# Patient Record
Sex: FEMALE | Race: WHITE | Employment: OTHER | URBAN - METROPOLITAN AREA
[De-identification: names, ages, dates, MRNs, and addresses within clinical notes are randomized per-mention and may not be internally consistent; named-entity substitution may affect disease eponyms.]

---

## 2017-01-01 ENCOUNTER — MED REC SCAN ONLY (OUTPATIENT)
Dept: FAMILY MEDICINE CLINIC | Facility: CLINIC | Age: 77
End: 2017-01-01

## 2017-01-04 ENCOUNTER — TELEPHONE (OUTPATIENT)
Dept: FAMILY MEDICINE CLINIC | Facility: CLINIC | Age: 77
End: 2017-01-04

## 2017-01-04 NOTE — TELEPHONE ENCOUNTER
Pt. Called stating that she had rec'd a letter in the past for her to receive free medication & she would like that letter again. Pt. Stated that it was for all medication & she thinks pharmacy kept the letter.

## 2017-01-04 NOTE — TELEPHONE ENCOUNTER
No record of a letter from our office. Typically there are assistance forms based on her medications but we would need patient to provide forms to be completed.  Please notify patient we would be happy to complete forms but we are not aware that a general l

## 2017-01-05 ENCOUNTER — TELEPHONE (OUTPATIENT)
Dept: NEUROLOGY | Facility: CLINIC | Age: 77
End: 2017-01-05

## 2017-01-16 ENCOUNTER — OFFICE VISIT (OUTPATIENT)
Dept: FAMILY MEDICINE CLINIC | Facility: CLINIC | Age: 77
End: 2017-01-16

## 2017-01-16 VITALS
HEART RATE: 72 BPM | TEMPERATURE: 98 F | WEIGHT: 152 LBS | HEIGHT: 60.75 IN | BODY MASS INDEX: 29.07 KG/M2 | SYSTOLIC BLOOD PRESSURE: 124 MMHG | RESPIRATION RATE: 18 BRPM | DIASTOLIC BLOOD PRESSURE: 70 MMHG

## 2017-01-16 DIAGNOSIS — R42 DIZZINESS: Primary | ICD-10-CM

## 2017-01-16 DIAGNOSIS — E11.9 TYPE 2 DIABETES MELLITUS WITHOUT COMPLICATION, UNSPECIFIED LONG TERM INSULIN USE STATUS: ICD-10-CM

## 2017-01-16 PROCEDURE — 99213 OFFICE O/P EST LOW 20 MIN: CPT | Performed by: PHYSICIAN ASSISTANT

## 2017-01-16 RX ORDER — GLIPIZIDE 5 MG/1
5 TABLET ORAL
Qty: 60 TABLET | Refills: 3 | Status: CANCELLED | OUTPATIENT
Start: 2017-01-16

## 2017-01-16 NOTE — PATIENT INSTRUCTIONS
Thank you for choosing Cory Lawton PA-C at [place] [address]  To Do: Sigrid Casiano  1. Pt to start using atrovent nasal spray  2. Pt to begin OTC Allegra in AM, OTC Zyrtec in PM  3.  Follow-up with Dr. Hammad Wilkinson as scheduled    • Please signup for [facility] AND [facility] we strive to make you healthier and to improve your quality of life.     Referrals, and Radiology Information:    If your insurance requires a referral to a specialist, please allow 5 business days to process your referral request.    If ROBBI Terry

## 2017-01-16 NOTE — PROGRESS NOTES
[facility] Internal Medicine Progress Note    CC:  Patient presents with:  Test Results  Dizziness: 1x daily, on and off for 1 month      HPI:   HPI  Pt states she has been having some dizziness on and off for the past month  She states it only l Rfl: 3   Memantine HCl 10 MG Oral Tab Take 1 tablet (10 mg total) by mouth 2 (two) times daily. Disp: 180 tablet Rfl: 3     No current facility-administered medications on file prior to visit.     Review of Systems :  Review of Systems   Constitutional: Neg complication, unspecified long term insulin use status  HgbA1C is improved some at 7.8  Pt was given referral for diabetic educator, however has not made an appt  Son would like another referral, he would like to go with his mother so he can learn more abo

## 2017-01-30 ENCOUNTER — TELEPHONE (OUTPATIENT)
Dept: FAMILY MEDICINE CLINIC | Facility: CLINIC | Age: 77
End: 2017-01-30

## 2017-01-30 NOTE — TELEPHONE ENCOUNTER
Singulex results from 12/14/16. Elevated lipids.   Total chol - 231  Trig - 152  LDL - 138  Apo B - 111    Future Appointments  Date Time Provider Pratima Turciosisti   2/13/2017 11:15 AM Brenda DURAN PA-C [facility] [address] [facility] [address]   2/16/2017 1:00 PM Lizzeth Mao

## 2017-02-01 ENCOUNTER — MED REC SCAN ONLY (OUTPATIENT)
Dept: FAMILY MEDICINE CLINIC | Facility: CLINIC | Age: 77
End: 2017-02-01

## 2017-02-01 NOTE — TELEPHONE ENCOUNTER
See results below. Attempted to contact patient regarding lab results. Unable to reach, left message to return call to discuss results.

## 2017-02-13 ENCOUNTER — TELEPHONE (OUTPATIENT)
Dept: FAMILY MEDICINE CLINIC | Facility: CLINIC | Age: 77
End: 2017-02-13

## 2017-02-13 ENCOUNTER — HOSPITAL ENCOUNTER (OUTPATIENT)
Dept: GENERAL RADIOLOGY | Age: 77
Discharge: HOME | End: 2017-02-13
Attending: PHYSICIAN ASSISTANT
Payer: MEDICARE

## 2017-02-13 ENCOUNTER — OFFICE VISIT (OUTPATIENT)
Dept: FAMILY MEDICINE CLINIC | Facility: CLINIC | Age: 77
End: 2017-02-13

## 2017-02-13 VITALS
BODY MASS INDEX: 29.07 KG/M2 | SYSTOLIC BLOOD PRESSURE: 118 MMHG | DIASTOLIC BLOOD PRESSURE: 78 MMHG | HEART RATE: 78 BPM | WEIGHT: 154 LBS | TEMPERATURE: 98 F | RESPIRATION RATE: 16 BRPM | HEIGHT: 61 IN

## 2017-02-13 DIAGNOSIS — R42 DIZZINESS: ICD-10-CM

## 2017-02-13 DIAGNOSIS — E11.9 TYPE 2 DIABETES MELLITUS WITHOUT COMPLICATION, WITHOUT LONG-TERM CURRENT USE OF INSULIN (HCC): ICD-10-CM

## 2017-02-13 DIAGNOSIS — R31.9 HEMATURIA: ICD-10-CM

## 2017-02-13 DIAGNOSIS — E53.8 LOW VITAMIN B12 LEVEL: ICD-10-CM

## 2017-02-13 DIAGNOSIS — R79.89 ELEVATED LFTS: ICD-10-CM

## 2017-02-13 DIAGNOSIS — Z12.31 ENCOUNTER FOR SCREENING MAMMOGRAM FOR BREAST CANCER: ICD-10-CM

## 2017-02-13 DIAGNOSIS — M85.80 OSTEOPENIA, UNSPECIFIED LOCATION: ICD-10-CM

## 2017-02-13 DIAGNOSIS — F02.80 ALZHEIMER'S DEMENTIA WITHOUT BEHAVIORAL DISTURBANCE, UNSPECIFIED TIMING OF DEMENTIA ONSET (HCC): ICD-10-CM

## 2017-02-13 DIAGNOSIS — R74.8 ELEVATED LIVER ENZYMES: ICD-10-CM

## 2017-02-13 DIAGNOSIS — K21.9 GASTROESOPHAGEAL REFLUX DISEASE WITHOUT ESOPHAGITIS: ICD-10-CM

## 2017-02-13 DIAGNOSIS — S96.911D RIGHT FOOT STRAIN, SUBSEQUENT ENCOUNTER: ICD-10-CM

## 2017-02-13 DIAGNOSIS — M25.552 LEFT HIP PAIN: ICD-10-CM

## 2017-02-13 DIAGNOSIS — Z00.00 ENCOUNTER FOR ANNUAL HEALTH EXAMINATION: Primary | ICD-10-CM

## 2017-02-13 DIAGNOSIS — N39.41 URGE INCONTINENCE OF URINE: ICD-10-CM

## 2017-02-13 DIAGNOSIS — E55.9 VITAMIN D DEFICIENCY: ICD-10-CM

## 2017-02-13 DIAGNOSIS — R05.9 COUGH: ICD-10-CM

## 2017-02-13 DIAGNOSIS — G30.9 ALZHEIMER'S DEMENTIA WITHOUT BEHAVIORAL DISTURBANCE, UNSPECIFIED TIMING OF DEMENTIA ONSET (HCC): ICD-10-CM

## 2017-02-13 DIAGNOSIS — Z23 NEED FOR INFLUENZA VACCINATION: ICD-10-CM

## 2017-02-13 DIAGNOSIS — E78.5 HYPERLIPIDEMIA, UNSPECIFIED HYPERLIPIDEMIA TYPE: ICD-10-CM

## 2017-02-13 DIAGNOSIS — K76.0 FATTY LIVER: ICD-10-CM

## 2017-02-13 PROCEDURE — 96160 PT-FOCUSED HLTH RISK ASSMT: CPT | Performed by: PHYSICIAN ASSISTANT

## 2017-02-13 PROCEDURE — 90686 IIV4 VACC NO PRSV 0.5 ML IM: CPT | Performed by: PHYSICIAN ASSISTANT

## 2017-02-13 PROCEDURE — G0008 ADMIN INFLUENZA VIRUS VAC: HCPCS | Performed by: PHYSICIAN ASSISTANT

## 2017-02-13 PROCEDURE — 73502 X-RAY EXAM HIP UNI 2-3 VIEWS: CPT

## 2017-02-13 NOTE — TELEPHONE ENCOUNTER
Pt req refill      Name of Medication: Methylfol-Algae-X92-Tvtzbqufzt (CEREFOLIN NAC) 6-90.314-2-600 MG Oral Tab     Dose:     How is medication prescribed: Take 1 tablet by mouth daily    Specific name of pharmacy and location:[facility] [place]

## 2017-02-13 NOTE — TELEPHONE ENCOUNTER
Requesting Cerefolin Nac  LOV: 2/13/17  RTC: not noted  Last Labs: n/a  Filled: 2/12/16 #90 with 3 refills    Future Appointments  Date Time Provider Pratima Corral   2/16/2017 1:00 PM Clifton Cates   2/21/2017 11:45 AM KANIKA Cartwright

## 2017-02-13 NOTE — PROGRESS NOTES
Antoine Lagos is a 68year old female who presents for a MA Supervisit.     Pt states she has been doing well    L groin pain for about 4 months  Sometimes when she gets up she feels a sharp pain, like lightening shooting  No other aggravating or alleviatin Oxybutynin Chloride ER 10 MG Oral Tablet 24 Hr Take 20 mg by mouth daily. Disp:  Rfl:    MetFORMIN HCl 1000 MG Oral Tab Take 1 tablet (1,000 mg total) by mouth 2 (two) times daily with meals.  Disp: 180 tablet Rfl: 3   glipiZIDE 5 MG Oral Tab Take 1 tab maintain a good energy level?: Stretching    How would you describe your daily physical activity?: Light    How would you describe your current health state?: Fair    How do you maintain positive mental well-being?: Social Interaction; Visiting Friends; Visi Please go to \"Cognitive Assessment\" under Medicare Assessment section in Charting, test patient and document. Then, refresh your progress note to see your input here.   Cognitive Assessment     What day of the week is this?: Correct    What month i patient. Update Health Maintenance if applicable    Chlamydia  Annually if high risk No results found for: CHLAMYDIA No flowsheet data found.     Screening Mammogram      Mammogram  Annually Mammogram,1 Yr due on 12/17/2016 Update Health Maintenance if appl on: 11/20/2015   Last Dilated Eye Exam 11/16/2015     No flowsheet data found. COPD      Spirometry Testing Annually No results found for this or any previous visit. No flowsheet data found.         ALLERGIES:     Pcn [Penicillins]       Rash    Comment: Types: Cigarettes    Smokeless Status: Never Used                        Comment: quit many years ago    Alcohol Use: No              Occ: retired       REVIEW OF SYSTEMS:   GENERAL: feels well otherwise  SKIN: denies any unusual skin lesions  EYE female who presents for a Medicare Assessment.      PLAN SUMMARY:   Diagnoses and all orders for this visit:    Encounter for annual health examination    Encounter for screening mammogram for breast cancer  -     KRYSTAL DIGITAL SCREENING W/CAD (CPT=/7705 management       The patient indicates understanding of these issues and agrees to the plan.   The patient is asked to return as scheduled with Dr. Hadley Zaragoza - Influenza, Pneumococcal, Zoster, Tetanus     Immunization History   Admi

## 2017-02-13 NOTE — PATIENT INSTRUCTIONS
Thank you for choosing Aric Moya PA-C at [place] [address]  To Do: Rhys Casiano  1. Referral for mammogram and eye doctor  2. Pt to get hip xrays and start physical therapy  3.  Pt to follow-up with Dr. Leia Jules as scheduled  Today in office we have effects of falls are more serious in older people. Overall, 3 to 4 out of every 10 people over the age of 72 fall each year. Up to 76 percent of people who fracture a hip never recover to the point they were before they had their fracture.  If you have fa in high places so you don’t have to reach or climb. Wear sturdy shoes that fit well – Wearing shoes with high heels or slippery soles, or shoes that are too loose can lead to falls.  Walking around in bare feet, or only socks, can also increase your risk [email or web address]  • You can NOW use the Dextryst to book your appointments with us, or consider to use open access scheduling which is through the [employer] website [email or web address] and type in [place], [place] and follow the links for \" Once our office has called informing you that the insurance company approved your testing, please call Central Scheduling at [phone]  Please allow our office 5 business days to contact you regarding any testing results.     Refill policies:   Allow 3 b Cardiovascular Disease Screening     Cholesterol, covered every 5 yrs including Total, LDL and Trigs LDL CHOLESTEROL (mg/dL)   Date Value   05/13/2016 99     LDL-CHOLESTEROL (mg/dL (calc))   Date Value   05/01/2015 104     LDL CHOLESTROL (mg/dL)   D screening   Covered every 2 yrs after age 72    Covered yearly for Long term Glucocorticoid medication (Steroids) Requires diagnosis related to osteoporosis or estrogen deficiency.    Biennial benefit unless patient has history of long-term glucocorticoid u TD and TDaP Not covered by Medicare Part B) No orders found for this or any previous visit.  This may be covered with your prescription benefits, but Medicare does not cover unless Medically needed    Zoster (Not covered by Medicare Part B) No orders found

## 2017-02-14 ENCOUNTER — APPOINTMENT (OUTPATIENT)
Dept: PHYSICAL THERAPY | Age: 77
End: 2017-02-14
Attending: PHYSICIAN ASSISTANT
Payer: MEDICARE

## 2017-02-15 NOTE — TELEPHONE ENCOUNTER
Patient notified of cholesterol result. She would like to follow low cholesterol diet, she has no computer. I told patient I would print one and mail to her. TLC diet info printed and mailed.

## 2017-02-16 ENCOUNTER — TELEPHONE (OUTPATIENT)
Dept: FAMILY MEDICINE CLINIC | Facility: CLINIC | Age: 77
End: 2017-02-16

## 2017-02-16 NOTE — TELEPHONE ENCOUNTER
No call yet by our office to patient? LM to call back.      Notes Recorded by Dani Cedillo PA-C on 2/15/2017 at 6:34 PM  Mild osteoarthritic changes  Continue with current treatment plan

## 2017-02-21 ENCOUNTER — OFFICE VISIT (OUTPATIENT)
Dept: PHYSICAL THERAPY | Age: 77
End: 2017-02-21
Attending: PHYSICIAN ASSISTANT
Payer: MEDICARE

## 2017-02-21 ENCOUNTER — TELEPHONE (OUTPATIENT)
Dept: FAMILY MEDICINE CLINIC | Facility: CLINIC | Age: 77
End: 2017-02-21

## 2017-02-21 DIAGNOSIS — M25.552 LEFT HIP PAIN: Primary | ICD-10-CM

## 2017-02-21 PROCEDURE — 97161 PT EVAL LOW COMPLEX 20 MIN: CPT

## 2017-02-21 NOTE — PROGRESS NOTES
LOWER EXTREMITY EVALUATION:   Referring Physician: Dr. Ronan Vasquez  Diagnosis: L hip pain     Date of Service: 2/21/2017     Patient 15 min late for appointment. Daughter present for exam. She is the her mother's .  Daughter mentioned that patient has performed  Palpation: Tender lateral hip structures  Sensation: Intact    AROM:   Hip   Flexion: R WNL; L WNL stiffness at end range  Extension: R 15; L 5  Abduction: R WNL; L WNL  ER: R WNL; L Unable to rest shin on hip  IR: R WNL; L 12     PROM:   Hip directions)  Rehab Potential:good    FOTO: 68/100  Current status G Code:  CJ  Goal status G Code:  CJ    Patient/Family (daughter -[place]) was advised of these findings, precautions, and treatment options and has agreed to actively participate in

## 2017-02-23 ENCOUNTER — OFFICE VISIT (OUTPATIENT)
Dept: PHYSICAL THERAPY | Age: 77
End: 2017-02-23
Attending: PHYSICIAN ASSISTANT
Payer: MEDICARE

## 2017-02-23 DIAGNOSIS — M25.552 LEFT HIP PAIN: Primary | ICD-10-CM

## 2017-02-23 PROCEDURE — 97110 THERAPEUTIC EXERCISES: CPT

## 2017-02-23 NOTE — PROGRESS NOTES
Dx: L hip pain           Authorized # of Visits:  8         Next MD visit: none scheduled  Fall Risk: standard         Precautions: memory loss, written material to help patient      10 minutes late  Subjective: 1 time sharp pain when walking.  0/10 walking Skilled Services:   To improve joint mobility and flexibility and then progress to [facility] for strengthening to help with transfers and gait    Charges:  Ex3       Total Timed Treatment: 45 min  Total Treatment Time: 45 min       LOWER EXTREMITY EVALUATION:   R Complexty: Low - Hip ROM and strength limitation. Patient safety and follow-up to program monitored with memory loss.  Patient prefers written directions    Precautions:  memory loss, DM  OBJECTIVE:   Observation/Posture: Not significant  Gait: Ambulate wit Frequency / Duration: Patient will be seen for 2 x/week for 2 weeks and then 1 x week for 2 weeks to progress HEPor a total of 6 visits over a 90 day period. Treatment will include: Manual Therapy; Therapeutic Exercises; Neuromuscular Re-education;  Marda Herd

## 2017-02-27 ENCOUNTER — TELEPHONE (OUTPATIENT)
Dept: FAMILY MEDICINE CLINIC | Facility: CLINIC | Age: 77
End: 2017-02-27

## 2017-02-27 NOTE — TELEPHONE ENCOUNTER
Patient states symptoms started Saturday, she was unable to eat Saturday and Sunday. Poor appetite, dizziness. Patient has vomited x2. Patient encouraged to go to [facility] for further eval but declined, stating she would rather wait for appt.  Patient does have

## 2017-02-28 ENCOUNTER — APPOINTMENT (OUTPATIENT)
Dept: PHYSICAL THERAPY | Age: 77
End: 2017-02-28
Attending: PHYSICIAN ASSISTANT
Payer: MEDICARE

## 2017-03-01 RX ORDER — L-MEFOL/A-CYST/MEB12/ALGAL OIL 6-600-2 MG
1 TABLET ORAL DAILY
Qty: 90 TABLET | Refills: 1 | Status: SHIPPED | OUTPATIENT
Start: 2017-03-01 | End: 2017-03-03

## 2017-03-02 ENCOUNTER — OFFICE VISIT (OUTPATIENT)
Dept: PHYSICAL THERAPY | Age: 77
End: 2017-03-02
Attending: PHYSICIAN ASSISTANT
Payer: MEDICARE

## 2017-03-02 DIAGNOSIS — M25.552 LEFT HIP PAIN: Primary | ICD-10-CM

## 2017-03-02 PROCEDURE — 97110 THERAPEUTIC EXERCISES: CPT

## 2017-03-02 NOTE — PROGRESS NOTES
Dx: L hip pain           Authorized # of Visits:  8         Next MD visit: none scheduled  Fall Risk: standard         Precautions: memory loss, written material to help patient        Subjective: Headache, dizzy and vomiting Saturday and Sunday.  Could not on uneven surfaces    Plan:  Continue PT to improve joint mobility and functional ROM for donning and doffing shoes. Improve flexibility at hip muscle and strengthening    Date: 2/23/2017  Tx#: 2/8 Date:   03/02/2017  Tx#: 3/ Date: Tx#: 4/ Date:    Tx#: 5 Not known. Current functional limitations include living alone at home. Decrease activity. Fatigue walking in. Patient reports no exercise and feels starting exercise would irritate hip at home. Patient wants to learn correct exercise.  At home she has provided on treatment plan and goals (daughter present).   Charges: PT Eval Low Complexity      Total Timed Treatment: 40 min     Total Treatment Time: 40 min     PLAN OF CARE:    Goals:    Patient will demonstrate independence in performing home exercise p

## 2017-03-03 ENCOUNTER — OFFICE VISIT (OUTPATIENT)
Dept: FAMILY MEDICINE CLINIC | Facility: CLINIC | Age: 77
End: 2017-03-03

## 2017-03-03 VITALS
HEART RATE: 72 BPM | TEMPERATURE: 98 F | SYSTOLIC BLOOD PRESSURE: 124 MMHG | HEIGHT: 61 IN | BODY MASS INDEX: 29.07 KG/M2 | RESPIRATION RATE: 16 BRPM | WEIGHT: 154 LBS | DIASTOLIC BLOOD PRESSURE: 60 MMHG

## 2017-03-03 DIAGNOSIS — E11.9 TYPE 2 DIABETES MELLITUS WITHOUT COMPLICATION, WITHOUT LONG-TERM CURRENT USE OF INSULIN (HCC): ICD-10-CM

## 2017-03-03 DIAGNOSIS — R42 VERTIGO: Primary | ICD-10-CM

## 2017-03-03 DIAGNOSIS — R26.81 UNSTEADY GAIT: ICD-10-CM

## 2017-03-03 PROCEDURE — 99214 OFFICE O/P EST MOD 30 MIN: CPT | Performed by: PHYSICIAN ASSISTANT

## 2017-03-03 RX ORDER — GLIPIZIDE 5 MG/1
TABLET ORAL
Qty: 180 TABLET | Refills: 0 | Status: CANCELLED | OUTPATIENT
Start: 2017-03-03

## 2017-03-03 RX ORDER — GLIPIZIDE 5 MG/1
5 TABLET ORAL
Qty: 60 TABLET | Refills: 3 | Status: SHIPPED | OUTPATIENT
Start: 2017-03-03 | End: 2017-07-10

## 2017-03-03 RX ORDER — METHYLPREDNISOLONE 4 MG/1
TABLET ORAL
Qty: 1 KIT | Refills: 0 | Status: SHIPPED | OUTPATIENT
Start: 2017-03-03 | End: 2017-04-05

## 2017-03-03 RX ORDER — L-MEFOL/A-CYST/MEB12/ALGAL OIL 6-600-2 MG
1 TABLET ORAL DAILY
Qty: 90 TABLET | Refills: 1 | Status: SHIPPED | OUTPATIENT
Start: 2017-03-03 | End: 2017-12-04

## 2017-03-03 RX ORDER — IPRATROPIUM BROMIDE 21 UG/1
2 SPRAY, METERED NASAL EVERY 12 HOURS
Qty: 1 BOTTLE | Refills: 0 | Status: SHIPPED | OUTPATIENT
Start: 2017-03-03 | End: 2017-09-06

## 2017-03-03 NOTE — TELEPHONE ENCOUNTER
Diabetic Medication Protocol Failed3/3 11:08 AM   Last HgBA1C < 7.5     Requesting Glipizide   LOV: 2/13/17 - Super visit   RTC: none   Last Labs: 12/2016  Filled: 10/6/16 #60 with 3 refills    Future Appointments  Date Time Provider Pratima Corral   3/

## 2017-03-03 NOTE — PROGRESS NOTES
[facility] Internal Medicine Progress Note    CC:  Patient presents with:  Dizziness: since saturday, sunday beign the worse experienced it in the whole body, Feels better today.   Nausea: feels it at top of stomach       HPI:   HPI  Dizziness  Pt Cardiovascular: Negative for chest pain. Gastrointestinal: Positive for nausea. Negative for vomiting. Neurological: Positive for dizziness. Negative for syncope, speech difficulty, light-headedness and headaches.          /60 mmHg  Pulse 72  Te (ATROVENT) 0.03 % Nasal Solution 1 Bottle 0      Si sprays by Nasal route every 12 (twelve) hours. methylPREDNISolone (MEDROL) 4 MG Oral Tablet Therapy Pack 1 kit 0      Sig: As directed.       Methylfol-Algae-E19-Cvuujkazwq (Andre Choi) 6-90.31

## 2017-03-03 NOTE — PATIENT INSTRUCTIONS
Thank you for choosing Hiro Fitzgerald PA-C at [place] [address]  To Do: Shyla Casiano  1. Pt to begin medications as prescribed   2. Start PT  3. Get MRI of brain  4.  Follow-up in 1 month, sooner if problems    • Please signup for MY CHART, which is el make you healthier and to improve your quality of life.     Referrals, and Radiology Information:    If your insurance requires a referral to a specialist, please allow 5 business days to process your referral request.    If Tori Hurtado PA-C orders a

## 2017-03-07 ENCOUNTER — OFFICE VISIT (OUTPATIENT)
Dept: PHYSICAL THERAPY | Age: 77
End: 2017-03-07
Attending: PHYSICIAN ASSISTANT
Payer: MEDICARE

## 2017-03-07 ENCOUNTER — APPOINTMENT (OUTPATIENT)
Dept: PHYSICAL THERAPY | Age: 77
End: 2017-03-07
Attending: PHYSICIAN ASSISTANT
Payer: MEDICARE

## 2017-03-07 PROCEDURE — 97110 THERAPEUTIC EXERCISES: CPT

## 2017-03-07 NOTE — PROGRESS NOTES
Dx: L hip pain           Authorized # of Visits:  8         Next MD visit: none scheduled  Fall Risk: standard         Precautions: memory loss, written material to help patient        Subjective: Patient reports no hip pain.  Daughter is not aware of hip p Patient will demonstrate 4+/5 hip strength to assist with transfers and ambulating on uneven surfaces    Plan:  Continue PT to improve joint mobility and functional ROM for donning and doffing shoes.  Improve flexibility at hip muscle and strengthening    D Medications: See list. Patient on exelon for memory. Deny medication for hip. Imaging: Mild OA changes. Pt describes pain level worse 5-6/10; 5/10; best 1/10.   Worse:  Soreness first getting up, sharp pain with turning when walking  Better: Not known IR: R 4/5; L 4/5 Flexion: R 4+/5; L 4+/5  Extension: R 4+/5; L 4+/5       Special tests: Hip horizontal abd supine with inclinometer:  40 rested on shin on L and 10 rested on shin on R.  Balance to assess    Today’s Treatment and Response: Patient education I certify the need for these services furnished under this plan of treatment and while under my care.     X___________________________________________________ Date____________________    Certification From: 3/04/7834  To:5/22/2017

## 2017-03-09 ENCOUNTER — APPOINTMENT (OUTPATIENT)
Dept: PHYSICAL THERAPY | Age: 77
End: 2017-03-09
Attending: PHYSICIAN ASSISTANT
Payer: MEDICARE

## 2017-03-14 ENCOUNTER — OFFICE VISIT (OUTPATIENT)
Dept: PHYSICAL THERAPY | Age: 77
End: 2017-03-14
Attending: PHYSICIAN ASSISTANT
Payer: MEDICARE

## 2017-03-14 DIAGNOSIS — M25.552 LEFT HIP PAIN: Primary | ICD-10-CM

## 2017-03-14 PROCEDURE — 97110 THERAPEUTIC EXERCISES: CPT

## 2017-03-14 NOTE — PROGRESS NOTES
Dx: L hip pain           Authorized # of Visits:  8         Next MD visit: none scheduled  Fall Risk: Precaution       Precautions: memory loss, written material to help patient, fall risk    15 minutes late for appointment  Discharge Summary    Pt has a Assessment: Patient has achieved ROm goals and experiencing less pain functionally. She has been dx with vertigo and MD request PT. No symptoms of vertigo at this time.  Hx of vertigo and falling, patient appropriate for balance screen and referring patient I certify the need for these services furnished under this plan of treatment and while under my care.     X___________________________________________________ Date____________________    Certification From: 6/04/5748  To:6/12/2017          Date: 2/23/2017 Hx: DM II - higher sugars. Managing with medications, memory loss, Does not drive, high cholesterol  Medications: See list. Patient on exelon for memory. Deny medication for hip. Imaging: Mild OA changes.     Pt describes pain level worse 5-6/10; 5/10; b Flexion: R 3+/5; L 4/5  Extension: R 4/5; L 3+/5  Abduction: R3+/5; L 3+/5  ER: R 4/5; L 4/5  IR: R 4/5; L 4/5 Flexion: R 4+/5; L 4+/5  Extension: R 4+/5; L 4+/5       Special tests: Hip horizontal abd supine with inclinometer:  40 rested on shin on L and [de-identified] certification required: Yes  I certify the need for these services furnished under this plan of treatment and while under my care.     X___________________________________________________ Date____________________    Certification From: 7/11/7161

## 2017-03-15 ENCOUNTER — APPOINTMENT (OUTPATIENT)
Dept: PHYSICAL THERAPY | Age: 77
End: 2017-03-15
Attending: PHYSICIAN ASSISTANT
Payer: MEDICARE

## 2017-03-21 ENCOUNTER — OFFICE VISIT (OUTPATIENT)
Dept: PHYSICAL THERAPY | Age: 77
End: 2017-03-21
Attending: PHYSICIAN ASSISTANT
Payer: MEDICARE

## 2017-03-21 PROCEDURE — 97110 THERAPEUTIC EXERCISES: CPT | Performed by: PHYSICAL THERAPIST

## 2017-03-21 PROCEDURE — 97162 PT EVAL MOD COMPLEX 30 MIN: CPT | Performed by: PHYSICAL THERAPIST

## 2017-03-21 NOTE — PROGRESS NOTES
VESTIBULAR EVALUATION:   Referring Physician: Dr. Dc Nelson  Diagnosis: Vertigo (R42)      Date of Service: 3/21/2017     PATIENT SUMMARY   Magnolia Ramirez is a 68year old y/o female who presents to therapy today with complaints of vertigo.  Pt indicates that symptoms during the positional testing and the test results are inconclusive. Nery Metzger would benefit from skilled Physical Therapy to address the above impairments of balance and gait impairments to return to prior level of function.      Precautions:  None  O or a total of 10 visits over a 90 day period. Treatment will include: Neuromuscular Re-education;  Therapeutic Exercises; Gait Training; Manual Therapy as needed    Education or treatment limitation: Cognition and Compliance  Rehab Potential: fair    G code

## 2017-03-23 ENCOUNTER — APPOINTMENT (OUTPATIENT)
Dept: PHYSICAL THERAPY | Age: 77
End: 2017-03-23
Attending: PHYSICIAN ASSISTANT
Payer: MEDICARE

## 2017-03-24 ENCOUNTER — APPOINTMENT (OUTPATIENT)
Dept: PHYSICAL THERAPY | Age: 77
End: 2017-03-24
Attending: PHYSICIAN ASSISTANT
Payer: MEDICARE

## 2017-03-28 ENCOUNTER — APPOINTMENT (OUTPATIENT)
Dept: PHYSICAL THERAPY | Age: 77
End: 2017-03-28
Attending: PHYSICIAN ASSISTANT
Payer: MEDICARE

## 2017-04-05 ENCOUNTER — OFFICE VISIT (OUTPATIENT)
Dept: FAMILY MEDICINE CLINIC | Facility: CLINIC | Age: 77
End: 2017-04-05

## 2017-04-05 VITALS
SYSTOLIC BLOOD PRESSURE: 120 MMHG | HEIGHT: 61 IN | WEIGHT: 151 LBS | BODY MASS INDEX: 28.51 KG/M2 | RESPIRATION RATE: 16 BRPM | DIASTOLIC BLOOD PRESSURE: 76 MMHG | HEART RATE: 84 BPM

## 2017-04-05 DIAGNOSIS — E11.9 TYPE 2 DIABETES MELLITUS WITHOUT COMPLICATION, WITHOUT LONG-TERM CURRENT USE OF INSULIN (HCC): ICD-10-CM

## 2017-04-05 DIAGNOSIS — E78.5 HYPERLIPIDEMIA, UNSPECIFIED HYPERLIPIDEMIA TYPE: ICD-10-CM

## 2017-04-05 DIAGNOSIS — K75.81 NASH (NONALCOHOLIC STEATOHEPATITIS): Primary | ICD-10-CM

## 2017-04-05 PROCEDURE — 99214 OFFICE O/P EST MOD 30 MIN: CPT | Performed by: INTERNAL MEDICINE

## 2017-04-05 NOTE — PROGRESS NOTES
CC: Patient presents with:  Medication Follow-Up  Diabetes       HPI:   1. PORTILLO (nonalcoholic steatohepatitis), no abd pain, saw liver doc in past  2. Hyperlipidemia, unspecified hyperlipidemia type, not taking zocor daily  3.  Type 2 diabetes mellitus (diabetes mellitus, type 2) (HCC)     Esophageal reflux     Low vitamin B12 level     Urge incontinence of urine     Elevated liver enzymes     Osteopenia     Elevated LFTs     Fatty liver     Abdominal pain     Dizziness     Hematuria     Cough     Right long-term current use of insulin ([facility] [address] doing well on metformin and glipizide, will cont  4. Prevention, reminded about mammogram, labs in 3 months. The patient indicates understanding of these issues and agrees to the plan.   Return in about 3 months (

## 2017-04-11 ENCOUNTER — HOSPITAL ENCOUNTER (OUTPATIENT)
Dept: MAMMOGRAPHY | Age: 77
Discharge: HOME | End: 2017-04-11
Attending: PHYSICIAN ASSISTANT
Payer: MEDICARE

## 2017-04-11 DIAGNOSIS — Z12.31 ENCOUNTER FOR SCREENING MAMMOGRAM FOR BREAST CANCER: ICD-10-CM

## 2017-04-11 PROCEDURE — 77067 SCR MAMMO BI INCL CAD: CPT

## 2017-04-13 ENCOUNTER — TELEPHONE (OUTPATIENT)
Dept: FAMILY MEDICINE CLINIC | Facility: CLINIC | Age: 77
End: 2017-04-13

## 2017-04-13 NOTE — TELEPHONE ENCOUNTER
Patient notified about her need for mammogram imaging. She will call and schedule. Patient has concerns with hair loss and wants to see someone. Scheduled with Chad Horne for Monday.        Future Appointments  Date Time Provider Pratima Corral   4/17/201

## 2017-04-21 ENCOUNTER — TELEPHONE (OUTPATIENT)
Dept: FAMILY MEDICINE CLINIC | Facility: CLINIC | Age: 77
End: 2017-04-21

## 2017-04-21 DIAGNOSIS — Z01.00 EYE EXAM, ROUTINE: ICD-10-CM

## 2017-04-21 DIAGNOSIS — E11.9 TYPE 2 DIABETES MELLITUS WITHOUT COMPLICATION, WITHOUT LONG-TERM CURRENT USE OF INSULIN (HCC): Primary | ICD-10-CM

## 2017-04-21 NOTE — TELEPHONE ENCOUNTER
referral  Received:  Zaina Lopez [facility]       Cc: [facility] Referral Pool       Phone Number: [phone]                     .Reason for the order/referral: referral for Dr. Zaira Gonzales   PCP: [email or web address] Dr. Swapnil Wang   Refer to

## 2017-05-01 ENCOUNTER — TELEPHONE (OUTPATIENT)
Dept: FAMILY MEDICINE CLINIC | Facility: CLINIC | Age: 77
End: 2017-05-01

## 2017-05-01 ENCOUNTER — HOSPITAL ENCOUNTER (OUTPATIENT)
Dept: MAMMOGRAPHY | Age: 77
Discharge: HOME | End: 2017-05-01
Attending: PHYSICIAN ASSISTANT
Payer: MEDICARE

## 2017-05-01 DIAGNOSIS — R92.8 ABNORMAL MAMMOGRAM: Primary | ICD-10-CM

## 2017-05-01 DIAGNOSIS — R92.2 INCONCLUSIVE MAMMOGRAM: ICD-10-CM

## 2017-05-01 PROCEDURE — 77065 DX MAMMO INCL CAD UNI: CPT

## 2017-05-01 NOTE — IMAGING NOTE
Asssisted Dr. Boby Nunez with recommendation for a left stereotactic breast biopsy for calcifications. Emotional and educational suport provided to pt and adult daughter. Written information provided to Oumar Astudillo and daughter, Yadira Mark.  Our breast center schedu

## 2017-05-01 NOTE — TELEPHONE ENCOUNTER
Radiologist called to inform patient has abnormal mammogram left breast and she is recommending biopsy. I told her I would let MD know. Our protocol is to send to breast surgeon. She is aware  Please see results.

## 2017-05-03 NOTE — TELEPHONE ENCOUNTER
Larry for patient to call regarding mammogram  [place] [address]  [facility]   Phone: [phone]

## 2017-05-03 NOTE — TELEPHONE ENCOUNTER
Spoke with Zari Gunn and gave her below information. She states she would like for me to call her daughter Emeterio Peterson and leave the surgeon information on [employer]. Referral entered for Dr. Mitchell Blake.

## 2017-05-04 ENCOUNTER — OFFICE VISIT (OUTPATIENT)
Dept: SURGERY | Facility: CLINIC | Age: 77
End: 2017-05-04

## 2017-05-04 VITALS
SYSTOLIC BLOOD PRESSURE: 125 MMHG | HEART RATE: 67 BPM | BODY MASS INDEX: 28.51 KG/M2 | RESPIRATION RATE: 16 BRPM | WEIGHT: 151 LBS | DIASTOLIC BLOOD PRESSURE: 74 MMHG | HEIGHT: 61 IN

## 2017-05-04 DIAGNOSIS — R92.8 ABNORMAL MAMMOGRAM OF LEFT BREAST: ICD-10-CM

## 2017-05-04 DIAGNOSIS — N63.10 MASS OF BREAST, RIGHT: Primary | ICD-10-CM

## 2017-05-04 DIAGNOSIS — Z80.3 FAMILY HISTORY OF BREAST CANCER IN SISTER: Primary | ICD-10-CM

## 2017-05-04 DIAGNOSIS — N63.10 BREAST MASS, RIGHT: ICD-10-CM

## 2017-05-04 PROCEDURE — 99203 OFFICE O/P NEW LOW 30 MIN: CPT | Performed by: SURGERY

## 2017-05-04 NOTE — H&P
New Patient  Oralia Larchwood  6/4/1940 5/4/2017    This patient was referred by MD Rusty Washington PA for evaluation and consultation for left abnormal mammogram.    Chief Complaint:   Abnormal mammogram of the left breast   Breast Pain: Some none        Past Surgical History    HERNIA SURGERY      Comment 30 years ago    COLONOSCOPY  8/10/15= Diverticulosis    Comment Repeat PRN    COLONOSCOPY N/A 8/10/2015    Comment Procedure: COLONOSCOPY;  Surgeon: Elizabeth Frederick MD;  Location: [address] [place] [address] 2 (two) times daily. , Disp: 180 tablet, Rfl: 3      Pcn [Penicillins]       Rash    Comment:CAPS    Family History   Problem Relation Age of Onset   • Cancer Other    • Stroke Other    • Heart Disease Neg    • Breast Cancer Sister 72     estimate       Obj The mammogram-ultrasound was reviewed independently and with the patient  Density:   moderate    Discussed: The potential treatment options were discussed with the patient.   The potential risks, benefits, outcomes/recovery and alternatives to any propose microcalcifications at the 12 o'clock position. This will be scheduled next week. Plan:     Ultrasound of the right breast at the 12 o'clock position, left stereotactic breast biopsy for microcalcifications.   Further recommendations will be pending f

## 2017-05-04 NOTE — PROGRESS NOTES
Quick Note:    Recommend biopsy  Pt should follow-up with general surgeon, Dr. Pasquale Lazo, I think I already signed off on this  ______

## 2017-05-08 ENCOUNTER — HOSPITAL ENCOUNTER (OUTPATIENT)
Dept: ULTRASOUND IMAGING | Age: 77
Discharge: HOME | End: 2017-05-08
Attending: SURGERY
Payer: MEDICARE

## 2017-05-08 DIAGNOSIS — N63.10 MASS OF BREAST, RIGHT: ICD-10-CM

## 2017-05-08 PROCEDURE — 76641 ULTRASOUND BREAST COMPLETE: CPT | Performed by: SURGERY

## 2017-05-09 ENCOUNTER — HOSPITAL ENCOUNTER (OUTPATIENT)
Dept: MAMMOGRAPHY | Facility: HOSPITAL | Age: 77
Discharge: HOME | End: 2017-05-09
Attending: SURGERY
Payer: MEDICARE

## 2017-05-09 DIAGNOSIS — R92.0 BREAST MICROCALCIFICATIONS: ICD-10-CM

## 2017-05-09 PROCEDURE — 88305 TISSUE EXAM BY PATHOLOGIST: CPT | Performed by: SURGERY

## 2017-05-09 PROCEDURE — 19081 BX BREAST 1ST LESION STRTCTC: CPT | Performed by: SURGERY

## 2017-05-13 NOTE — OPERATIVE REPORT
[facility]  Operative Note    Daniel Tran Location: OR   CSN [id number] MRN [id number]   Admission Date 5/9/2017 Operation Date 5/9/2017   Attending Physician No att. providers found Operating Physician Emil Wilkinson MD     Date of procedure:    May procedure were discussed in detail with the patient including but not limited to bleeding, infection, seroma/ hematoma formation, postoperative wound complications, possible change in the shape or contour of the breast, possible need for further surgery or

## 2017-05-16 ENCOUNTER — OFFICE VISIT (OUTPATIENT)
Dept: SURGERY | Facility: CLINIC | Age: 77
End: 2017-05-16

## 2017-05-16 VITALS
DIASTOLIC BLOOD PRESSURE: 68 MMHG | SYSTOLIC BLOOD PRESSURE: 110 MMHG | HEIGHT: 62 IN | TEMPERATURE: 98 F | HEART RATE: 71 BPM | WEIGHT: 154 LBS | BODY MASS INDEX: 28.34 KG/M2

## 2017-05-16 DIAGNOSIS — N63.10 BREAST MASS, RIGHT: ICD-10-CM

## 2017-05-16 DIAGNOSIS — R92.8 ABNORMAL MAMMOGRAM OF LEFT BREAST: ICD-10-CM

## 2017-05-16 DIAGNOSIS — Z98.890 S/P LEFT BREAST BIOPSY: ICD-10-CM

## 2017-05-16 DIAGNOSIS — Z98.890 S/P BREAST BIOPSY, LEFT: Primary | ICD-10-CM

## 2017-05-16 PROCEDURE — 99213 OFFICE O/P EST LOW 20 MIN: CPT | Performed by: SURGERY

## 2017-05-17 PROBLEM — Z98.890 S/P LEFT BREAST BIOPSY: Status: ACTIVE | Noted: 2017-05-17

## 2017-05-17 NOTE — PROGRESS NOTES
GENERAL SURGERY    Wayne Robles MD, FACS, Hamilton Rota. Rose Mariano MD, Carmella Ang MD, FACS  Sera Vann.  Sandeep Rivas MD, Javier Adan MD, FACS  [facility] PRETTY SALDAÑA PA-C  6/4/1940 5/17/20

## 2017-05-25 ENCOUNTER — TELEPHONE (OUTPATIENT)
Dept: FAMILY MEDICINE CLINIC | Facility: CLINIC | Age: 77
End: 2017-05-25

## 2017-05-25 DIAGNOSIS — H35.372 MACULAR PUCKER, LEFT EYE: Primary | ICD-10-CM

## 2017-05-25 DIAGNOSIS — E11.9 TYPE 2 DIABETES MELLITUS WITHOUT COMPLICATION, WITHOUT LONG-TERM CURRENT USE OF INSULIN (HCC): ICD-10-CM

## 2017-05-25 NOTE — TELEPHONE ENCOUNTER
Referral  Received:  Today       Cam Long CNA  [facility]       Cc: [facility] Referral Pool       Phone Number: [phone]                     .Reason for the order/referral:Referral   PCP: Antoni Lopez   Refer to Provider (first

## 2017-07-11 ENCOUNTER — TELEPHONE (OUTPATIENT)
Dept: FAMILY MEDICINE CLINIC | Facility: CLINIC | Age: 77
End: 2017-07-11

## 2017-07-11 RX ORDER — OXYBUTYNIN CHLORIDE 10 MG/1
TABLET, EXTENDED RELEASE ORAL
Qty: 180 TABLET | Refills: 5 | OUTPATIENT
Start: 2017-07-11

## 2017-07-11 NOTE — TELEPHONE ENCOUNTER
Requesting: Glipizide  LOV: 4/5/17  RTC: 3 months  Last Labs: 12/14/16 - A1C  Filled: 3/3/17 #60 with 3 refills    Future Appointments  Date Time Provider Pratima Corral   7/26/2017 11:20 AM Shawanda Norman MD [facility] [address]   8/8/2017 1:00 PM Eusebio

## 2017-07-11 NOTE — TELEPHONE ENCOUNTER
Requesting Metformin 1000mg  LOV: 4/5/17  RTC: 3 months  Last Labs: 12/14/16  Filled: 3/3/17 #180 with 3 refills    Future Appointments  Date Time Provider Pratima Corral   7/26/2017 11:20 AM Misty Velez MD [facility] [address] [facility] [address]   8/8/2017 1:00 PM Jillian Vargas

## 2017-07-11 NOTE — TELEPHONE ENCOUNTER
Name of Medication: MetFORMIN HCl 1000 MG Oral Tab       Dose:     How is medication prescribed:    Specific name of pharmacy and location: [facility], [address]    Name of mail order company:

## 2017-07-12 RX ORDER — GLIPIZIDE 5 MG/1
TABLET ORAL
Qty: 180 TABLET | Refills: 0 | Status: SHIPPED | OUTPATIENT
Start: 2017-07-12 | End: 2017-09-06

## 2017-07-17 ENCOUNTER — TELEPHONE (OUTPATIENT)
Dept: FAMILY MEDICINE CLINIC | Facility: CLINIC | Age: 77
End: 2017-07-17

## 2017-07-17 NOTE — TELEPHONE ENCOUNTER
I spoke with Ana Win and she recommends patient see dermatology. Dr. Rdz Medicus  [address] [facility], [address]   Phone: [phone]    She declines derm in [place].   I advised her to research who she would like and let us know to place referra

## 2017-07-28 ENCOUNTER — TELEPHONE (OUTPATIENT)
Dept: FAMILY MEDICINE CLINIC | Facility: CLINIC | Age: 77
End: 2017-07-28

## 2017-07-31 ENCOUNTER — TELEPHONE (OUTPATIENT)
Dept: FAMILY MEDICINE CLINIC | Facility: CLINIC | Age: 77
End: 2017-07-31

## 2017-07-31 ENCOUNTER — OFFICE VISIT (OUTPATIENT)
Dept: FAMILY MEDICINE CLINIC | Facility: CLINIC | Age: 77
End: 2017-07-31

## 2017-07-31 ENCOUNTER — APPOINTMENT (OUTPATIENT)
Dept: CT IMAGING | Facility: HOSPITAL | Age: 77
End: 2017-07-31
Attending: EMERGENCY MEDICINE
Payer: MEDICARE

## 2017-07-31 ENCOUNTER — APPOINTMENT (OUTPATIENT)
Dept: GENERAL RADIOLOGY | Facility: HOSPITAL | Age: 77
End: 2017-07-31
Attending: EMERGENCY MEDICINE
Payer: MEDICARE

## 2017-07-31 ENCOUNTER — HOSPITAL ENCOUNTER (EMERGENCY)
Facility: HOSPITAL | Age: 77
Discharge: HOME | End: 2017-07-31
Attending: EMERGENCY MEDICINE
Payer: MEDICARE

## 2017-07-31 VITALS
WEIGHT: 144.63 LBS | RESPIRATION RATE: 16 BRPM | HEIGHT: 62 IN | HEART RATE: 120 BPM | SYSTOLIC BLOOD PRESSURE: 130 MMHG | DIASTOLIC BLOOD PRESSURE: 72 MMHG | BODY MASS INDEX: 26.61 KG/M2 | TEMPERATURE: 99 F

## 2017-07-31 VITALS
RESPIRATION RATE: 18 BRPM | TEMPERATURE: 98 F | OXYGEN SATURATION: 98 % | SYSTOLIC BLOOD PRESSURE: 136 MMHG | WEIGHT: 142 LBS | DIASTOLIC BLOOD PRESSURE: 67 MMHG | HEIGHT: 61 IN | BODY MASS INDEX: 26.81 KG/M2 | HEART RATE: 65 BPM

## 2017-07-31 DIAGNOSIS — R26.81 UNSTABLE GAIT: ICD-10-CM

## 2017-07-31 DIAGNOSIS — B37.9 CANDIDIASIS: ICD-10-CM

## 2017-07-31 DIAGNOSIS — R42 DIZZINESS: ICD-10-CM

## 2017-07-31 DIAGNOSIS — S09.90XA HEAD INJURY, INITIAL ENCOUNTER: Primary | ICD-10-CM

## 2017-07-31 DIAGNOSIS — S80.211A ABRASION, KNEE, RIGHT, INITIAL ENCOUNTER: ICD-10-CM

## 2017-07-31 DIAGNOSIS — R41.0 CONFUSION: ICD-10-CM
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
## 2017-07-31 DIAGNOSIS — S00.93XA CONTUSION OF HEAD, UNSPECIFIED PART OF HEAD, INITIAL ENCOUNTER: ICD-10-CM

## 2017-07-31 DIAGNOSIS — F03.90 DEMENTIA WITHOUT BEHAVIORAL DISTURBANCE, UNSPECIFIED DEMENTIA TYPE (HCC): ICD-10-CM

## 2017-07-31 LAB
ALBUMIN SERPL-MCNC: 3.8 G/DL (ref 3.5–4.8)
ALP LIVER SERPL-CCNC: 90 U/L (ref 55–142)
ALT SERPL-CCNC: 98 U/L (ref 14–54)
AST SERPL-CCNC: 98 U/L (ref 15–41)
BASOPHILS # BLD AUTO: 0.02 X10(3) UL (ref 0–0.1)
BASOPHILS NFR BLD AUTO: 0.4 %
BILIRUB SERPL-MCNC: 0.6 MG/DL (ref 0.1–2)
BILIRUB UR QL STRIP.AUTO: NEGATIVE
BUN BLD-MCNC: 11 MG/DL (ref 8–20)
CALCIUM BLD-MCNC: 9.3 MG/DL (ref 8.3–10.3)
CHLORIDE: 102 MMOL/L (ref 101–111)
CO2: 23 MMOL/L (ref 22–32)
COLOR UR AUTO: YELLOW
CREAT BLD-MCNC: 0.72 MG/DL (ref 0.55–1.02)
EOSINOPHIL # BLD AUTO: 0.15 X10(3) UL (ref 0–0.3)
EOSINOPHIL NFR BLD AUTO: 3.3 %
ERYTHROCYTE [DISTWIDTH] IN BLOOD BY AUTOMATED COUNT: 11.6 % (ref 11.5–16)
ETHYL ALCOHOL: <3 MG/DL (ref ?–3)
GLUCOSE BLD-MCNC: 348 MG/DL (ref 65–99)
GLUCOSE BLD-MCNC: 348 MG/DL (ref 70–99)
GLUCOSE UR STRIP.AUTO-MCNC: >=500 MG/DL
HCT VFR BLD AUTO: 37.8 % (ref 34–50)
HGB BLD-MCNC: 13 G/DL (ref 12–16)
IMMATURE GRANULOCYTE COUNT: 0.02 X10(3) UL (ref 0–1)
IMMATURE GRANULOCYTE RATIO %: 0.4 %
LEUKOCYTE ESTERASE UR QL STRIP.AUTO: NEGATIVE
LYMPHOCYTES # BLD AUTO: 0.91 X10(3) UL (ref 0.9–4)
LYMPHOCYTES NFR BLD AUTO: 20.3 %
M PROTEIN MFR SERPL ELPH: 6.9 G/DL (ref 6.1–8.3)
MCH RBC QN AUTO: 31.9 PG (ref 27–33.2)
MCHC RBC AUTO-ENTMCNC: 34.4 G/DL (ref 31–37)
MCV RBC AUTO: 92.6 FL (ref 81–100)
MONOCYTES # BLD AUTO: 0.32 X10(3) UL (ref 0.1–0.6)
MONOCYTES NFR BLD AUTO: 7.1 %
NEUTROPHIL ABS PRELIM: 3.06 X10 (3) UL (ref 1.3–6.7)
NEUTROPHILS # BLD AUTO: 3.06 X10(3) UL (ref 1.3–6.7)
NEUTROPHILS NFR BLD AUTO: 68.5 %
NITRITE UR QL STRIP.AUTO: NEGATIVE
PH UR STRIP.AUTO: 5 [PH] (ref 4.5–8)
PLATELET # BLD AUTO: 167 10(3)UL (ref 150–450)
POTASSIUM SERPL-SCNC: 4.1 MMOL/L (ref 3.6–5.1)
PROT UR STRIP.AUTO-MCNC: NEGATIVE MG/DL
RBC # BLD AUTO: 4.08 X10(6)UL (ref 3.8–5.1)
RBC UR QL AUTO: NEGATIVE
RED CELL DISTRIBUTION WIDTH-SD: 39.6 FL (ref 35.1–46.3)
SODIUM SERPL-SCNC: 135 MMOL/L (ref 136–144)
SP GR UR STRIP.AUTO: 1.02 (ref 1–1.03)
UROBILINOGEN UR STRIP.AUTO-MCNC: <2 MG/DL
WBC # BLD AUTO: 4.5 X10(3) UL (ref 4–13)

## 2017-07-31 PROCEDURE — 81003 URINALYSIS AUTO W/O SCOPE: CPT | Performed by: EMERGENCY MEDICINE

## 2017-07-31 PROCEDURE — 96360 HYDRATION IV INFUSION INIT: CPT

## 2017-07-31 PROCEDURE — 99214 OFFICE O/P EST MOD 30 MIN: CPT | Performed by: NURSE PRACTITIONER

## 2017-07-31 PROCEDURE — 99284 EMERGENCY DEPT VISIT MOD MDM: CPT

## 2017-07-31 PROCEDURE — 82962 GLUCOSE BLOOD TEST: CPT

## 2017-07-31 PROCEDURE — 80320 DRUG SCREEN QUANTALCOHOLS: CPT | Performed by: EMERGENCY MEDICINE

## 2017-07-31 PROCEDURE — 71010 XR CHEST AP PORTABLE  (CPT=71010): CPT | Performed by: EMERGENCY MEDICINE

## 2017-07-31 PROCEDURE — 85025 COMPLETE CBC W/AUTO DIFF WBC: CPT | Performed by: EMERGENCY MEDICINE

## 2017-07-31 PROCEDURE — 70450 CT HEAD/BRAIN W/O DYE: CPT | Performed by: EMERGENCY MEDICINE

## 2017-07-31 PROCEDURE — 80053 COMPREHEN METABOLIC PANEL: CPT | Performed by: EMERGENCY MEDICINE

## 2017-07-31 RX ORDER — NYSTATIN 100000 [USP'U]/G
1 POWDER TOPICAL 2 TIMES DAILY
Qty: 1 BOTTLE | Refills: 0 | Status: SHIPPED | OUTPATIENT
Start: 2017-07-31 | End: 2017-08-31

## 2017-07-31 NOTE — ED PROVIDER NOTES
Patient Seen in: [facility] Emergency Department    History   Patient presents with:  Altered Mental Status (neurologic)  Fall (musculoskeletal, neurologic)    Stated Complaint:     HPI    71-year-old female who presents after a fall.   The patient t Patch 24 Hr,  Place 1 patch onto the skin daily. EXELON 4.6 MG/24HR Transdermal Patch 24 Hr,  APPLY ONE PATCH TOPICALLY ONCE DAILY   Cholecalciferol (VITAMIN D) 1000 UNITS Oral Tab,  Take 1,000 Units by mouth daily.    simvastatin 10 MG Oral Tab,  TAKE 1 intact in all 4 extremities  Sensation is intact in all 4 extremities  Cerebellar finger-nose and heel-to-shin are normal  Deep tendon reflexes are normal    ED Course     Labs Reviewed   COMP METABOLIC PANEL (14) - Abnormal; Notable for the following: type    Disposition:  Discharge    Follow-up:  Melinda Linn, [address] [facility]  [phone]    In 2 days      Jacob Posadas DO  [address]  [address] [place] [address] [phone]    In 1 week

## 2017-07-31 NOTE — ED INITIAL ASSESSMENT (HPI)
Pt dx w/ dementia 4 months ago and had license taken away. Pt had a dr appointment on Friday but missed it. Pt walked self to [facility] for Dr long that she says was rescheduled. Pt did fall while walking to ER. Hematoma noted to BEE hammond.  Pt states f

## 2017-07-31 NOTE — CM/SW NOTE
Patient here with altered mental status, fall. Hx: dementia/memory loss, on exelon patch and memantine.  Daughter Bharat OROURKE at bedside, concerned patient cant care for self, has had 5 recent falls,poor decision making that is now progressing (patient denies),

## 2017-07-31 NOTE — TELEPHONE ENCOUNTER
Patient daughter, Hayley Fisher, was contacted today re: patient condition and was informed she was sent to [facility] ER via ambulance. Patient thought she had an appt today and daughter re-iterated to her she did NOT have an appt today.  Patient st

## 2017-07-31 NOTE — TELEPHONE ENCOUNTER
Patient was escorted by a male,  Approximately 5'10\", , in late 19's or early 29's, who said that he witnessed the patient fall by the [employer] (he must not know that it was a [employer] - as it is between her home and our office on [address].

## 2017-07-31 NOTE — PROGRESS NOTES
[facility] Internal Medicine Office Note  Chief Complaint:   Patient presents with:  Rash: outside vaginal area, bumps innner vaginal lips  Fall: fell this morning on her way here, scraped right side knee, has a bump on her right side eyebrow, de Types: Cigarettes  Smokeless tobacco: Never Used                      Comment: quit many years ago  Alcohol use:  No              Allergies:    Pcn [Penicillins]       Rash    Comment:CAPS    Current Outpatient Prescriptions:  GLIPIZIDE 5 MG Oral Tab MICHAEL Neurological: Positive for loss of consciousness. Psychiatric/Behavioral: Positive for confusion.       EXAM:   /72 (BP Location: Right arm, Patient Position: Sitting, Cuff Size: adult)   Pulse 120   Temp 99.4 °F (37.4 °C) (Temporal)   Resp 16   Ht Fall, initial encounter  (primary encounter diagnosis)  Contusion of head, unspecified part of head, initial encounter  Confusion  Abrasion, knee, right, initial encounter    The plan is as follows  [place] was seen today for rash and fall.     Diagnoses and Low vitamin B12 level     Urge incontinence of urine     Elevated liver enzymes     Osteopenia     Elevated LFTs     Fatty liver     Abdominal pain     Dizziness     Hematuria     Cough     Right foot strain, subsequent encounter     PORTILLO (nonalcoholic

## 2017-07-31 NOTE — CM/SW NOTE
ECIN referral sent to [employer], spoke to [facility], currently in-house and will speak to patient and daughter shortly. Patient and daughter updated with plan. ED physician and primary rn updated, patient to be discharged from ED.      Edgardo Vega,

## 2017-08-02 ENCOUNTER — TELEPHONE (OUTPATIENT)
Dept: FAMILY MEDICINE CLINIC | Facility: CLINIC | Age: 77
End: 2017-08-02

## 2017-08-02 NOTE — TELEPHONE ENCOUNTER
- Spoke with daughter Eve Patient in regards to setting up ER follow up.  She declined making an appointment at this time and is following up with her neurologist.

## 2017-08-03 ENCOUNTER — TELEPHONE (OUTPATIENT)
Dept: NEUROLOGY | Facility: CLINIC | Age: 77
End: 2017-08-03

## 2017-08-07 ENCOUNTER — TELEPHONE (OUTPATIENT)
Dept: FAMILY MEDICINE CLINIC | Facility: CLINIC | Age: 77
End: 2017-08-07

## 2017-08-08 PROCEDURE — 82607 VITAMIN B-12: CPT | Performed by: OTHER

## 2017-08-09 ENCOUNTER — TELEPHONE (OUTPATIENT)
Dept: FAMILY MEDICINE CLINIC | Facility: CLINIC | Age: 77
End: 2017-08-09

## 2017-08-09 DIAGNOSIS — L65.9 HAIR LOSS: Primary | ICD-10-CM

## 2017-08-09 NOTE — TELEPHONE ENCOUNTER
Pts daughter called states pt does not need her appt for an H & P requesting to speak with nurse regarding visit

## 2017-08-09 NOTE — TELEPHONE ENCOUNTER
Daughter [facility] was contacted and she informs that she contacted [address],  Ambrose Wang who informed her that they only need medical records and H&P does not need to be recent (may-now).  Will contact Ambrose Wang at [phone] t

## 2017-08-09 NOTE — TELEPHONE ENCOUNTER
Contacted Charleywinston Kareempalomo pts daughter and informed of referral being placed, advised that referral department will inform her when referral is approved through pts insurance. She verbalized understanding. No further questions at this time.     Time started: 2:00 pm

## 2017-08-09 NOTE — TELEPHONE ENCOUNTER
Requesting Referral to Dermatology( Dr. Shanae Ohara )  LOV: 7/31/17   RTC: none specified  Reason: Hair loss  Referral pended  Future Appointments  Date Time Provider Pratima Corral   9/14/2017 1:20 PM Gabriella VERDUZCO   9/27/2017

## 2017-08-09 NOTE — TELEPHONE ENCOUNTER
Time started: 4:21 pm  Time ended: 4:30 pm  Total time spent on chart: 9 mins    Delilah Roberts from [facility], states that they do not need a recent H&P (May-Now, as indicated on form), apologizes for the confusion and states they onl

## 2017-08-09 NOTE — TELEPHONE ENCOUNTER
.Reason for the order/referral: Dr. Alonso Wagner   PCP: [email or web address] Dr. Scar Marvin   Refer to Provider (first and last name): Dr. Alonso Wagner   Specialty: dermatology   Patient Insurance: Payor: [facility] MED ADV HMO HUM / Plan: Artur Chris  HMO / Product Type

## 2017-08-09 NOTE — TELEPHONE ENCOUNTER
Patient needs an appointment - for History and Physical Assessment - her acute visit with Denia Renner does not qualify. Please call her to schedule. She was last seen by Dr. Calvin Alexander 4/5/17. They want progress notes from May on.

## 2017-08-09 NOTE — TELEPHONE ENCOUNTER
Spoke to daughter, informed that pt needs an OV for H&P to complete paperwork, daughter verbalized understanding and schedule OV below.     Future Appointments  Date Time Provider Pratima Corral   8/10/2017 9:30 AM FELECIA He [facility] [address]

## 2017-08-10 NOTE — TELEPHONE ENCOUNTER
Caitlin Grimm RN       8/9/17 4:23 PM   Note      Time started: 4:21 pm  Time ended: 4:30 pm  Total time spent on chart: 9 mins     Bar Damon from [facility], states that they do not need a recent H&P (May-Now, as indicated on

## 2017-08-11 NOTE — TELEPHONE ENCOUNTER
LOV notes from 7/31/17, 4/5/17, 2/13/17 faxed to [place]: Mainor Aguilera at [phone] (26 pages). Daughter Cristhian Butcher aware. Above fax number would not work multiple times.   Called Nick and markus another fax number to se

## 2017-08-28 ENCOUNTER — TELEPHONE (OUTPATIENT)
Dept: FAMILY MEDICINE CLINIC | Facility: CLINIC | Age: 77
End: 2017-08-28

## 2017-08-29 DIAGNOSIS — B37.9 CANDIDIASIS: ICD-10-CM

## 2017-08-30 RX ORDER — NYSTATIN 100000 [USP'U]/G
POWDER TOPICAL
Refills: 0 | OUTPATIENT
Start: 2017-08-30

## 2017-08-31 RX ORDER — NYSTATIN 100000 U/G
CREAM TOPICAL
Refills: 1 | OUTPATIENT
Start: 2017-08-31

## 2017-08-31 NOTE — TELEPHONE ENCOUNTER
Requesting Nystatin [id number] UNIT/GM External Cream  LOV: 7/31/17 Ty Hunt  RTC: none specified  Last Labs: n.a  Filled: 7/14/16 #30 g with 1 refills    Contacted both pt and daughter to see if pt is still using, pt daughter Marilia Leonel states pt is using po

## 2017-08-31 NOTE — TELEPHONE ENCOUNTER
Requesting Nystatin [id number] UNIT/GM External powder  LOV: 7/31/17 Adeline Iglesias  RTC: none specified  Last Labs: n.a  Filled: 7/13/17 #1 btl with 0 refills     Contacted both pt and daughter to see if pt is still using, pt daughter Rob Maurer states pt is using po

## 2017-09-01 RX ORDER — NYSTATIN 100000 [USP'U]/G
1 POWDER TOPICAL 2 TIMES DAILY
Qty: 1 BOTTLE | Refills: 0 | Status: SHIPPED | OUTPATIENT
Start: 2017-09-01 | End: 2018-07-10

## 2017-09-06 ENCOUNTER — APPOINTMENT (OUTPATIENT)
Dept: CV DIAGNOSTICS | Facility: HOSPITAL | Age: 77
End: 2017-09-06
Attending: HOSPITALIST
Payer: MEDICARE

## 2017-09-06 ENCOUNTER — APPOINTMENT (OUTPATIENT)
Dept: GENERAL RADIOLOGY | Facility: HOSPITAL | Age: 77
End: 2017-09-06
Attending: EMERGENCY MEDICINE
Payer: MEDICARE

## 2017-09-06 ENCOUNTER — HOSPITAL ENCOUNTER (OUTPATIENT)
Facility: HOSPITAL | Age: 77
Setting detail: OBSERVATION
Discharge: HOME | End: 2017-09-07
Attending: EMERGENCY MEDICINE | Admitting: HOSPITALIST
Payer: MEDICARE

## 2017-09-06 DIAGNOSIS — R07.9 ACUTE CHEST PAIN: Primary | ICD-10-CM

## 2017-09-06 DIAGNOSIS — R06.00 DYSPNEA, UNSPECIFIED TYPE: ICD-10-CM

## 2017-09-06 PROBLEM — R73.9 HYPERGLYCEMIA: Status: ACTIVE | Noted: 2017-09-06

## 2017-09-06 LAB
ALBUMIN SERPL-MCNC: 3.8 G/DL (ref 3.5–4.8)
ALP LIVER SERPL-CCNC: 95 U/L (ref 55–142)
ALT SERPL-CCNC: 62 U/L (ref 14–54)
APTT PPP: 27.5 SECONDS (ref 25–34)
AST SERPL-CCNC: 45 U/L (ref 15–41)
ATRIAL RATE: 61 BPM
BASOPHILS # BLD AUTO: 0.01 X10(3) UL (ref 0–0.1)
BASOPHILS NFR BLD AUTO: 0.2 %
BILIRUB SERPL-MCNC: 0.5 MG/DL (ref 0.1–2)
BUN BLD-MCNC: 9 MG/DL (ref 8–20)
CALCIUM BLD-MCNC: 9.5 MG/DL (ref 8.3–10.3)
CHLORIDE: 108 MMOL/L (ref 101–111)
CO2: 26 MMOL/L (ref 22–32)
CREAT BLD-MCNC: 0.59 MG/DL (ref 0.55–1.02)
EOSINOPHIL # BLD AUTO: 0.15 X10(3) UL (ref 0–0.3)
EOSINOPHIL NFR BLD AUTO: 3.7 %
ERYTHROCYTE [DISTWIDTH] IN BLOOD BY AUTOMATED COUNT: 11.7 % (ref 11.5–16)
EST. AVERAGE GLUCOSE BLD GHB EST-MCNC: 278 MG/DL (ref 68–126)
GLUCOSE BLD-MCNC: 106 MG/DL (ref 65–99)
GLUCOSE BLD-MCNC: 143 MG/DL (ref 70–99)
GLUCOSE BLD-MCNC: 148 MG/DL (ref 65–99)
GLUCOSE BLD-MCNC: 151 MG/DL (ref 65–99)
HBA1C MFR BLD HPLC: 11.3 % (ref ?–5.7)
HCT VFR BLD AUTO: 40.2 % (ref 34–50)
HGB BLD-MCNC: 13.7 G/DL (ref 12–16)
IMMATURE GRANULOCYTE COUNT: 0.01 X10(3) UL (ref 0–1)
IMMATURE GRANULOCYTE RATIO %: 0.2 %
INR BLD: 1.05 (ref 0.89–1.11)
LYMPHOCYTES # BLD AUTO: 1.56 X10(3) UL (ref 0.9–4)
LYMPHOCYTES NFR BLD AUTO: 38.5 %
M PROTEIN MFR SERPL ELPH: 7 G/DL (ref 6.1–8.3)
MCH RBC QN AUTO: 31.9 PG (ref 27–33.2)
MCHC RBC AUTO-ENTMCNC: 34.1 G/DL (ref 31–37)
MCV RBC AUTO: 93.7 FL (ref 81–100)
MONOCYTES # BLD AUTO: 0.34 X10(3) UL (ref 0.1–0.6)
MONOCYTES NFR BLD AUTO: 8.4 %
NEUTROPHIL ABS PRELIM: 1.98 X10 (3) UL (ref 1.3–6.7)
NEUTROPHILS # BLD AUTO: 1.98 X10(3) UL (ref 1.3–6.7)
NEUTROPHILS NFR BLD AUTO: 49 %
P AXIS: 33 DEGREES
P-R INTERVAL: 160 MS
PLATELET # BLD AUTO: 193 10(3)UL (ref 150–450)
POTASSIUM SERPL-SCNC: 3.9 MMOL/L (ref 3.6–5.1)
PSA SERPL DL<=0.01 NG/ML-MCNC: 13.7 SECONDS (ref 12–14.3)
Q-T INTERVAL: 454 MS
QRS DURATION: 78 MS
QTC CALCULATION (BEZET): 457 MS
R AXIS: -34 DEGREES
RBC # BLD AUTO: 4.29 X10(6)UL (ref 3.8–5.1)
RED CELL DISTRIBUTION WIDTH-SD: 39.8 FL (ref 35.1–46.3)
SODIUM SERPL-SCNC: 141 MMOL/L (ref 136–144)
T AXIS: 14 DEGREES
TROPONIN: <0.046 NG/ML (ref ?–0.05)
VENTRICULAR RATE: 61 BPM
WBC # BLD AUTO: 4.1 X10(3) UL (ref 4–13)

## 2017-09-06 PROCEDURE — 71010 XR CHEST AP PORTABLE  (CPT=71010): CPT | Performed by: EMERGENCY MEDICINE

## 2017-09-06 PROCEDURE — 93306 TTE W/DOPPLER COMPLETE: CPT | Performed by: HOSPITALIST

## 2017-09-06 PROCEDURE — 99220 INITIAL OBSERVATION CARE,LEVL III: CPT | Performed by: HOSPITALIST

## 2017-09-06 RX ORDER — NITROGLYCERIN 0.4 MG/1
0.4 TABLET SUBLINGUAL EVERY 5 MIN PRN
Status: DISCONTINUED | OUTPATIENT
Start: 2017-09-06 | End: 2017-09-07

## 2017-09-06 RX ORDER — MEMANTINE HYDROCHLORIDE 10 MG/1
10 TABLET ORAL 2 TIMES DAILY
Status: DISCONTINUED | OUTPATIENT
Start: 2017-09-06 | End: 2017-09-07

## 2017-09-06 RX ORDER — GLIPIZIDE 5 MG/1
5 TABLET ORAL
COMMUNITY
End: 2017-11-21

## 2017-09-06 RX ORDER — OXYBUTYNIN CHLORIDE 5 MG/1
5 TABLET ORAL 4 TIMES DAILY
Status: DISCONTINUED | OUTPATIENT
Start: 2017-09-06 | End: 2017-09-07

## 2017-09-06 RX ORDER — ACETAMINOPHEN 325 MG/1
650 TABLET ORAL EVERY 6 HOURS PRN
Status: DISCONTINUED | OUTPATIENT
Start: 2017-09-06 | End: 2017-09-07

## 2017-09-06 RX ORDER — ASPIRIN 325 MG
325 TABLET ORAL DAILY
Status: DISCONTINUED | OUTPATIENT
Start: 2017-09-06 | End: 2017-09-07

## 2017-09-06 RX ORDER — TEMAZEPAM 15 MG/1
15 CAPSULE ORAL NIGHTLY PRN
Status: DISCONTINUED | OUTPATIENT
Start: 2017-09-06 | End: 2017-09-07

## 2017-09-06 RX ORDER — ONDANSETRON 2 MG/ML
4 INJECTION INTRAMUSCULAR; INTRAVENOUS EVERY 4 HOURS PRN
Status: DISCONTINUED | OUTPATIENT
Start: 2017-09-06 | End: 2017-09-07

## 2017-09-06 RX ORDER — SIMVASTATIN 10 MG
10 TABLET ORAL NIGHTLY
COMMUNITY
End: 2017-09-07

## 2017-09-06 RX ORDER — RIVASTIGMINE TARTRATE 6 MG/1
6 CAPSULE ORAL 2 TIMES DAILY
Status: DISCONTINUED | OUTPATIENT
Start: 2017-09-06 | End: 2017-09-07

## 2017-09-06 RX ORDER — SODIUM CHLORIDE 9 MG/ML
INJECTION, SOLUTION INTRAVENOUS CONTINUOUS
Status: DISCONTINUED | OUTPATIENT
Start: 2017-09-06 | End: 2017-09-06

## 2017-09-06 RX ORDER — DEXTROSE MONOHYDRATE 25 G/50ML
50 INJECTION, SOLUTION INTRAVENOUS
Status: DISCONTINUED | OUTPATIENT
Start: 2017-09-06 | End: 2017-09-07

## 2017-09-06 RX ORDER — ASPIRIN 81 MG/1
324 TABLET, CHEWABLE ORAL ONCE
Status: COMPLETED | OUTPATIENT
Start: 2017-09-06 | End: 2017-09-06

## 2017-09-06 RX ORDER — PRAVASTATIN SODIUM 20 MG
20 TABLET ORAL NIGHTLY
Status: DISCONTINUED | OUTPATIENT
Start: 2017-09-06 | End: 2017-09-07

## 2017-09-06 NOTE — H&P
[facility] H[facility]PITAL[facility]  History and Physical     Maddie Boyer Patient Status:  Observation    1940 MRN [id number]   [facility] 3NE-A Attending Salome Boyle MD   Hosp Day # 0 PCP Jeannette Tobar MD     Chief Complaint: Patient presents with times daily. Disp: 1 Bottle Rfl: 0   Rivastigmine Tartrate 6 MG Oral Cap Take 1 capsule (6 mg total) by mouth 2 (two) times daily.  Disp: 60 capsule Rfl: 12   MetFORMIN HCl 1000 MG Oral Tab Take 1 tablet (1,000 mg total) by mouth 2 (two) times daily with me 193.0   INR  1.05       Recent Labs   Lab  09/06/17   0719   GLU  143*   BUN  9   CREATSERUM  0.59   CA  9.5   ALB  3.8   NA  141   K  3.9   CL  108   CO2  26.0   ALKPHO  95   AST  45*   ALT  62*   BILT  0.5   TP  7.0       Recent Labs   Lab  09/06/17   07

## 2017-09-06 NOTE — ED PROVIDER NOTES
Patient Seen in: [facility] Emergency Department    History   Patient presents with:  Chest Pain Angina (cardiovascular)    Stated Complaint: chest pain    HPI    The patient is a 22-year-old female with medical history as noted below including hist chest pain  Other systems are as noted in HPI. Constitutional and vital signs reviewed. All other systems reviewed and negative except as noted above. PSFH elements reviewed from today and agreed except as otherwise stated in HPI.     Physical Exam limits   TROPONIN I - Normal   PROTHROMBIN TIME (PT) - Normal   PTT, ACTIVATED - Normal    Narrative: The aPTT Heparin Therapeutic Range is approximately 65- 104 seconds.  The therapeutic range has been validated against 0.3-0.7 heparin anti-Xa units/mL time.  In light of multiple risk factors for heart disease including high cholesterol and diabetes the patient will be admitted to the hospital for further care. Dr. Trinity Sen was notified from the ER. Patient will be admitted for further care at this time.

## 2017-09-06 NOTE — PROGRESS NOTES
09/06/17 1410   Clinical Encounter Type   Referral To (Referral made to Father Rivera Ding.)   Sacramental Encounters   Sacrament Other Referral made to Father Rivera Ding for Shinto services/confession as requested by the patient.

## 2017-09-06 NOTE — PLAN OF CARE
NURSING ADMISSION NOTE      Patient admitted via Cart  Oriented to room. Safety precautions initiated. Bed in low position. Call light in reach. Admission navigators completed. Assumed patient care at 1000. Patient alert and oriented x3-4.   Fo

## 2017-09-06 NOTE — HISTORICAL OFFICE NOTE
Pinky Millard  : 1940  MRNO:  [id number]  [phone]  PCP: Dr. Brock Anguiano     TODAY'S DATE: 2007     CHIEF COMPLAINT: Followup of Chest pain, Followup of Hypercholesterolemia and Left arm pain and heaviness.       HISTORY OF PRESENT ILLNESS: A 6 PCN    MEDS: Aspirin 81 mg, 1 PO qd; Avandamet 500/ 1 mg, 1 po bid  unsure of dose; simvastatin 40 mg, 1 PO qhs    VITAL SIGNS: B/P - 120/80, Pulse - 72, Respiration - 16, Weight - 159.00  CONS: comfortable. HEAD/FACE: no trauma and normocephalic.  EYES: co

## 2017-09-06 NOTE — CONSULTS
[facility]  Report of Consultation    Angel Luis Hughes Patient Status:  Observation    1940 MRN [id number]   [facility] 3NE-A Attending Onel Espinoza MD   Hosp Day # 0 PCP Alma Dominguez MD     Reason for Consultation:  Chest pain    Hi smoked 1.00 pack per day. She has never used smokeless tobacco. She reports that she does not drink alcohol or use drugs.     Allergies:    Pcn [Penicillins]       Rash    Comment:CAPS    Medications:    Current Facility-Administered Medications:   •  ondan NSR  General: Alert and oriented in no apparent distress. HEENT: No focal deficits. Neck: No JVD, carotids 2+ no bruits. Cardiac: Regular rate and rhythm, S1, S2 normal, no rub or gallop.  1/6 HSM  Lungs: Clear without wheezes, rales, rhonchi or dullness

## 2017-09-07 ENCOUNTER — TELEPHONE (OUTPATIENT)
Dept: FAMILY MEDICINE CLINIC | Facility: CLINIC | Age: 77
End: 2017-09-07

## 2017-09-07 ENCOUNTER — APPOINTMENT (OUTPATIENT)
Dept: CV DIAGNOSTICS | Facility: HOSPITAL | Age: 77
End: 2017-09-07
Attending: HOSPITALIST
Payer: MEDICARE

## 2017-09-07 VITALS
TEMPERATURE: 98 F | SYSTOLIC BLOOD PRESSURE: 128 MMHG | HEIGHT: 61 IN | HEART RATE: 57 BPM | DIASTOLIC BLOOD PRESSURE: 65 MMHG | RESPIRATION RATE: 18 BRPM | OXYGEN SATURATION: 98 % | BODY MASS INDEX: 26.37 KG/M2 | WEIGHT: 139.69 LBS

## 2017-09-07 LAB
CHOLEST SMN-MCNC: 178 MG/DL (ref ?–200)
GLUCOSE BLD-MCNC: 155 MG/DL (ref 65–99)
GLUCOSE BLD-MCNC: 184 MG/DL (ref 65–99)
HDLC SERPL-MCNC: 64 MG/DL (ref 45–?)
HDLC SERPL: 2.78 {RATIO} (ref ?–4.44)
LDLC SERPL CALC-MCNC: 88 MG/DL (ref ?–130)
LDLC SERPL-MCNC: 26 MG/DL (ref 5–40)
NONHDLC SERPL-MCNC: 114 MG/DL (ref ?–130)
TRIGLYCERIDES: 128 MG/DL (ref ?–150)
TROPONIN: <0.046 NG/ML (ref ?–0.05)

## 2017-09-07 PROCEDURE — 78452 HT MUSCLE IMAGE SPECT MULT: CPT | Performed by: HOSPITALIST

## 2017-09-07 PROCEDURE — 93018 CV STRESS TEST I&R ONLY: CPT | Performed by: HOSPITALIST

## 2017-09-07 PROCEDURE — 93017 CV STRESS TEST TRACING ONLY: CPT | Performed by: HOSPITALIST

## 2017-09-07 PROCEDURE — 99217 OBSERVATION CARE DISCHARGE: CPT | Performed by: HOSPITALIST

## 2017-09-07 RX ORDER — ATORVASTATIN CALCIUM 20 MG/1
20 TABLET, FILM COATED ORAL NIGHTLY
Qty: 30 TABLET | Refills: 2 | Status: SHIPPED | OUTPATIENT
Start: 2017-09-07 | End: 2017-10-07

## 2017-09-07 NOTE — PAYOR COMM NOTE
--------------  ADMISSION REVIEW       9/6    ED    Chest Pain Angina      Stated Complaint: chest pain     HPI     The patient is a 29-year-old female with medical history  including history of high cholesterol, type 2 diabetes   who presents emergency ro answering all questions appropriately.        VSS   ED Triage Vitals [09/06/17 0715]  BP: 153/75  Pulse: 56  Resp: 18  Temp: (!) 97.2 °F (36.2 °C)  Temp src: Temporal  SpO2: 95  O2 Device: n/a                 Labs Reviewed   COMP METABOLIC PANEL (14) - Abn

## 2017-09-07 NOTE — CM/SW NOTE
Patient was screened during rounds and no needs are identified at this time. RN to contact SW/CM if needs arise. 09/07/17 1500   CM/SW Screening   Referral Source Social Work (self-referral)   [address] staff; Chart review;Nursing rounds

## 2017-09-07 NOTE — DIETARY NOTE
Nutrition Short Note    Dietitian provided low Na diet education per pt request.  Appropriate education and handout provided. All questions answered. RD available PRN.     Larissa Gonzales, [address], [place] [address]

## 2017-09-07 NOTE — PROGRESS NOTES
09/07/17 1039   Clinical Encounter Type   Visited With Patient not available;Health care provider   [facility] Fr. Mini March attempted to visit; however, patient was off unit for a stress test. Will try to visit again tomorrow (Friday the 8th).

## 2017-09-07 NOTE — TELEPHONE ENCOUNTER
Gilma called to notify office that pt will no longer be seeing Dr Richard Victoria she has found a new pcp

## 2017-09-07 NOTE — PROGRESS NOTES
[facility]  Cardiology Progress Note    Aditya Carreno Patient Status:  Observation    1940 MRN [id number]   [facility] 3NE-A Attending Aida Orozco MD   Hosp Day # 0 PCP Leyla Ramos MD     Subjective:  Doing well with no chest pa Assessment:    · Chest pain, normal ECG and troponin, echo with EF 65-70%, no wall motion abnormalities   · DM, poorly controlled, A1c 13.7  · HLD- LDL 88   · Elevated LFT's - appears chronically elevated, possibly related to ETOH.  Discussed in d

## 2017-09-07 NOTE — PROGRESS NOTES
NURSING DISCHARGE NOTE    Discharged Home via Wheelchair. Accompanied by Family member and Support staff  Belongings Taken by patient/family. IV sites discontinued.  Discharge instructions, medications, and follow up were discussed with patient and

## 2017-09-08 ENCOUNTER — OFFICE VISIT (OUTPATIENT)
Dept: FAMILY MEDICINE CLINIC | Facility: CLINIC | Age: 77
End: 2017-09-08

## 2017-09-08 ENCOUNTER — PATIENT OUTREACH (OUTPATIENT)
Dept: CASE MANAGEMENT | Age: 77
End: 2017-09-08

## 2017-09-08 VITALS
RESPIRATION RATE: 16 BRPM | SYSTOLIC BLOOD PRESSURE: 124 MMHG | WEIGHT: 141 LBS | HEART RATE: 82 BPM | HEIGHT: 62 IN | TEMPERATURE: 99 F | BODY MASS INDEX: 25.95 KG/M2 | DIASTOLIC BLOOD PRESSURE: 72 MMHG

## 2017-09-08 DIAGNOSIS — F03.90 DEMENTIA WITHOUT BEHAVIORAL DISTURBANCE, UNSPECIFIED DEMENTIA TYPE (HCC): ICD-10-CM

## 2017-09-08 DIAGNOSIS — E11.65 POORLY CONTROLLED DIABETES MELLITUS (HCC): Primary | ICD-10-CM

## 2017-09-08 DIAGNOSIS — R07.9 ACUTE CHEST PAIN: ICD-10-CM

## 2017-09-08 PROCEDURE — 99213 OFFICE O/P EST LOW 20 MIN: CPT | Performed by: FAMILY MEDICINE

## 2017-09-08 NOTE — PROGRESS NOTES
[facility] called phone number listed it is for Bozena's daughter Brielle Lemons gave [facility] Bozena's phone number [phone], however, Bharat OROURKE states that her mother does not answer her phone all the time and if I cannot get a hold of her I can call Bharat OROURKE this afte

## 2017-09-08 NOTE — PATIENT INSTRUCTIONS
Start Januvia 100 mg daily  Follow up in 3 months      Diabetes and Alcohol Consumption    If you have diabetes, you need to be careful with alcohol. Alcohol can affect how well you control your blood sugar (glucose) level.  It can also increase risks to yo your weight. Alcohol is high in calories and has no nutrition. If you are on a meal plan to help control your weight, you will need to count alcohol as part of your daily calorie intake.  A standard drink is usually counted as 90 calories or 2 fat exchanges limit drinking to:  · Women: No more than 1 drink a day  · Men: No more than 2 drinks a day  One drink equals 12 ounces of beer, 5 ounces of wine, or 1.5 ounces of hard liquor (for example, vodka, whiskey, or gin).    Date Last Reviewed: 5/1/2016  © 2000-2

## 2017-09-08 NOTE — DISCHARGE SUMMARY
[facility]  DISCHARGE SUMMARY     Diana Schmidt Patient Status:  Observation    1940 MRN [id number]   [facility] 3NE-A Attending No att. providers found   Hosp Day # 0 PCP Maulik Domingo MD     Date of Admission: 2017  Date of D She was stable for discharge home.     Consultants:  • cardiology      Discharge Medication List:     Discharge Medications      START taking these medications      Instructions Prescription details   atorvastatin 20 MG Tabs  Commonly known as:  LIPITOR [address]   [phone]            Vital signs:  Temp:  [97.9 °F (36.6 °C)-98.1 °F (36.7 °C)] 98.1 °F (36.7 °C)  Pulse:  [57-60] 57  Resp:  [16-18] 18  BP: (105-128)/(54-65) 128/65    Physical Exam:    General: No acute distress.    Respirat

## 2017-09-08 NOTE — PROGRESS NOTES
HPI:   Magnolia Ramirez is a 68year old female that presents for hospital follow up. Recently admitted for episode of acute chest pain. She has hx of Dm2, HTN, HLD, dementia.   Patient had negative ACS r/o including Lexiscan that showed normal LVH and no evid hepatosplenomegaly. EXTREMITIES:  Trace pedal edema, no cyanosis  NEURO:  Grossly normal     ASSESSMENT AND PLAN:      1.  Poorly controlled DM2 (HCC)  - A1c 11.3, goal A1c < 8.5 given age and comorbidities  - will continue metformin and glipizide and add

## 2017-09-08 NOTE — PROGRESS NOTES
Initial Post Discharge Follow Up   Discharge Date: 9/7/17  Contact Date: 9/8/2017    Consent Verification:  Assessment Completed With: Patient  HIPAA Verified?   Yes    Discharge Dx:    Chest pain    General:   • How have you been since your discharge fr times daily.  Disp: 180 tablet Rfl: 3     • When you were leaving the hospital were any medication changes discussed with you? yes  • If you were prescribed a new medication:   o Was the new medication’s purpose explained? yes  o Was the new medication’s si Reviewed/scheduled/rescheduled PCP TCM/HFU appointment with pt:  SUKH placed call to Dr. Greenberg office to get permission to change visit type to [address] [place] [address] appointment. Have you made all of your follow up appointments?  yes    Is there any reason as to w

## 2017-09-20 RX ORDER — GLIPIZIDE 5 MG/1
TABLET ORAL
Qty: 180 TABLET | Refills: 0 | OUTPATIENT
Start: 2017-09-20

## 2017-10-03 ENCOUNTER — LAB ENCOUNTER (OUTPATIENT)
Dept: LAB | Age: 77
End: 2017-10-03
Attending: DERMATOLOGY
Payer: MEDICARE

## 2017-10-03 DIAGNOSIS — R53.83 FATIGUE: ICD-10-CM

## 2017-10-03 DIAGNOSIS — L65.9 LOSS OF HAIR: Primary | ICD-10-CM

## 2017-10-03 PROCEDURE — 86038 ANTINUCLEAR ANTIBODIES: CPT

## 2017-10-03 PROCEDURE — 36415 COLL VENOUS BLD VENIPUNCTURE: CPT

## 2017-10-03 PROCEDURE — 84403 ASSAY OF TOTAL TESTOSTERONE: CPT

## 2017-10-03 PROCEDURE — 82627 DEHYDROEPIANDROSTERONE: CPT

## 2017-10-03 PROCEDURE — 84443 ASSAY THYROID STIM HORMONE: CPT

## 2017-10-03 PROCEDURE — 82728 ASSAY OF FERRITIN: CPT

## 2017-10-31 ENCOUNTER — TELEPHONE (OUTPATIENT)
Dept: FAMILY MEDICINE CLINIC | Facility: CLINIC | Age: 77
End: 2017-10-31

## 2017-10-31 NOTE — TELEPHONE ENCOUNTER
Received authorization and request for medical records from [facility]. Request is for medical history, physical, and medication list. Patient has only seen Dr. Isha Patel once.  Medication list and office visit note from 9-8-17 fax

## 2017-11-13 DIAGNOSIS — E11.9 DIABETES MELLITUS TYPE 2 IN NONOBESE (HCC): Primary | ICD-10-CM

## 2017-11-13 RX ORDER — ATORVASTATIN CALCIUM 20 MG/1
TABLET, FILM COATED ORAL
COMMUNITY
Start: 2017-09-08 | End: 2017-12-20

## 2017-11-13 NOTE — TELEPHONE ENCOUNTER
Requested Medications   JANUVIA 100 MG Tablet  Will file in chart as: JANUVIA 100 MG Oral Tab  TAKE 1 TABLET EVERY DAY       Disp: 90 tablet Refills: 0    Class: Normal Start: 11/13/2017   Originally ordered: 2 months ago by Efrain Chen DO  Last refil

## 2017-11-14 NOTE — TELEPHONE ENCOUNTER
Future Appointments  Date Time Provider Pratima Corral   12/4/2017 11:30 AM Tomy Lemus,  [facility] [address] [facility] [address]   1/16/2018 11:00 AM Jolynn Cates [facility]     Please add DM labs so she can get them prior to the appt.  Please call Sa

## 2017-11-14 NOTE — TELEPHONE ENCOUNTER
Future Appointments  Date Time Provider Pratima Corral   1/16/2018 11:00 AM Thao Cates     Pt is due for follow up with dr. Michelle Young first week of December. Please call pt to schedule.   Medication pended

## 2017-11-15 RX ORDER — SITAGLIPTIN 100 MG/1
TABLET, FILM COATED ORAL
Qty: 90 TABLET | Refills: 0 | Status: SHIPPED | OUTPATIENT
Start: 2017-11-15 | End: 2017-12-04

## 2017-11-15 NOTE — TELEPHONE ENCOUNTER
JANUVIA 100 MG Tablet  Will file in chart as: JANUVIA 100 MG Oral Tab  TAKE 1 TABLET EVERY DAY       Disp: 90 tablet Refills: 0    Class: Normal Start: 11/13/2017   Originally ordered: 2 months ago by Mariama Dickerson DO  Last refill: 9/11/2017  Diabetic M

## 2017-11-16 NOTE — TELEPHONE ENCOUNTER
Dr. Jessica Pickering out of office and refill given for pt for her Januvia 100mg for 90 days until pt returns.  Dr. Sydnie Stringer

## 2017-11-21 ENCOUNTER — TELEPHONE (OUTPATIENT)
Dept: FAMILY MEDICINE CLINIC | Facility: CLINIC | Age: 77
End: 2017-11-21

## 2017-11-21 RX ORDER — GLIPIZIDE 5 MG/1
5 TABLET ORAL
Qty: 180 TABLET | Refills: 0 | Status: SHIPPED | OUTPATIENT
Start: 2017-11-21 | End: 2018-01-29

## 2017-11-21 RX ORDER — MEMANTINE HYDROCHLORIDE 10 MG/1
10 TABLET ORAL 2 TIMES DAILY
Qty: 180 TABLET | Refills: 3 | Status: SHIPPED | OUTPATIENT
Start: 2017-11-21 | End: 2018-07-17

## 2017-11-21 RX ORDER — MEMANTINE HYDROCHLORIDE 10 MG/1
10 TABLET ORAL 2 TIMES DAILY
Qty: 14 TABLET | Refills: 0 | Status: CANCELLED | OUTPATIENT
Start: 2017-11-21

## 2017-11-21 NOTE — TELEPHONE ENCOUNTER
Juel Frankel, DO   [facility] Clinical Staff 8 minutes ago (2:05 PM)     Glipizide and memantine refilled. Will discuss alternatives to januvia at appt in 2 weeks and determine new meds pending A1c.  (Routing comment)     Patient was called and notified o

## 2017-11-21 NOTE — TELEPHONE ENCOUNTER
Future Appointments  Date Time Provider Pratima Corral   12/4/2017 11:30 AM Priya Lemus, DO [facility] [address] [facility] [address]   1/16/2018 11:00 AM Andrei Cates [facility]     LOV: 9/8/17  Last A1C: 11.3 on 9/6/17    Patient requesting refills on G

## 2017-11-21 NOTE — TELEPHONE ENCOUNTER
Patient will need refills on Glipizide and Memantine, Patient would like one week of Memantine sent to local pharmacy. Patient would like regular refills sent to [facility] as well.  Patient is completely out of Memantine,

## 2017-11-21 NOTE — TELEPHONE ENCOUNTER
Patient called back to ask for different medication in place of the Januvia. She said a 3 month supply costs $482.30, which she can not afford. Please prescribe an alternative.

## 2017-12-04 ENCOUNTER — OFFICE VISIT (OUTPATIENT)
Dept: FAMILY MEDICINE CLINIC | Facility: CLINIC | Age: 77
End: 2017-12-04

## 2017-12-04 VITALS
HEART RATE: 96 BPM | HEIGHT: 62 IN | WEIGHT: 137.38 LBS | RESPIRATION RATE: 16 BRPM | BODY MASS INDEX: 25.28 KG/M2 | TEMPERATURE: 98 F | DIASTOLIC BLOOD PRESSURE: 60 MMHG | SYSTOLIC BLOOD PRESSURE: 102 MMHG

## 2017-12-04 DIAGNOSIS — E11.9 DIABETES MELLITUS TYPE 2 IN NONOBESE (HCC): Primary | ICD-10-CM

## 2017-12-04 DIAGNOSIS — Z23 NEED FOR PNEUMOCOCCAL VACCINATION: ICD-10-CM

## 2017-12-04 DIAGNOSIS — Z23 NEEDS FLU SHOT: ICD-10-CM

## 2017-12-04 PROCEDURE — 83036 HEMOGLOBIN GLYCOSYLATED A1C: CPT | Performed by: FAMILY MEDICINE

## 2017-12-04 PROCEDURE — 90653 IIV ADJUVANT VACCINE IM: CPT | Performed by: FAMILY MEDICINE

## 2017-12-04 PROCEDURE — G0009 ADMIN PNEUMOCOCCAL VACCINE: HCPCS | Performed by: FAMILY MEDICINE

## 2017-12-04 PROCEDURE — 90732 PPSV23 VACC 2 YRS+ SUBQ/IM: CPT | Performed by: FAMILY MEDICINE

## 2017-12-04 PROCEDURE — G0008 ADMIN INFLUENZA VIRUS VAC: HCPCS | Performed by: FAMILY MEDICINE

## 2017-12-04 PROCEDURE — 99214 OFFICE O/P EST MOD 30 MIN: CPT | Performed by: FAMILY MEDICINE

## 2017-12-04 NOTE — PROGRESS NOTES
HPI:   Maddie Boyer is a 68year old female that presents for DM follow up. Patient was previously very noncompliant with medications and eating very unhealthy diet. A1c was 11, 3 months ago.   She has since moved into an assisted living facility where the encounter: 62\". Weight as of this encounter: 137 lb 6 oz. Vital signs reviewed. Appears stated age, well groomed. Physical Exam:  GEN:  Patient is alert, awake and oriented, well developed, well nourished, no apparent distress.   HEENT:     Head:  Nor

## 2017-12-15 ENCOUNTER — TELEPHONE (OUTPATIENT)
Dept: FAMILY MEDICINE CLINIC | Facility: CLINIC | Age: 77
End: 2017-12-15

## 2017-12-15 NOTE — TELEPHONE ENCOUNTER
Patients daughter came in and needs a Verification of Chronic Condition form to be completed asap. She stated she spoke with someone and they said to bring it in and they will fax it to the fax number on the form. Please call when patient can .

## 2017-12-18 NOTE — TELEPHONE ENCOUNTER
Roxana Cummings is [place] the [place] [facility] wanting to know if we have the chronic condition form for patient. I did let her know if it is noted as received and MD is not in office daily so when it is signed we will forward.     Time: 2 min

## 2017-12-20 NOTE — TELEPHONE ENCOUNTER
Requesting: Atorvastatin 20mg  LOV: 12/4/17  RTC: 3 months  Last Relevant Labs: 9/7/17 - LIPID done at the hospital.  Filled: 9/7/17 #30 with 2 refills - from Dr. Shira Jenkins at the hospital    Future Appointments  Date Time Provider Pratima Corral   1/16/20

## 2017-12-20 NOTE — TELEPHONE ENCOUNTER
Pt needs a refill on her     atorvastatin 20 MG Oral Tab   9/8/2017    Route:   (none)     Class:   Historical     Order #:   [id number]       Please  advise

## 2017-12-21 RX ORDER — ATORVASTATIN CALCIUM 20 MG/1
20 TABLET, FILM COATED ORAL NIGHTLY
Qty: 90 TABLET | Refills: 1 | Status: SHIPPED | OUTPATIENT
Start: 2017-12-21 | End: 2018-05-31

## 2018-01-29 RX ORDER — GLIPIZIDE 5 MG/1
5 TABLET ORAL
Qty: 180 TABLET | Refills: 0 | Status: SHIPPED | OUTPATIENT
Start: 2018-01-29 | End: 2018-03-05 | Stop reason: ALTCHOICE

## 2018-01-29 NOTE — TELEPHONE ENCOUNTER
Requesting: Glipizide 5mg  LOV: 12/4/17  RTC: 3 months  Last Relevant Labs: 12/4/17 - A1C: 6.8  Filled: 11/21/17 #180 with 0 refills    Future Appointments  Date Time Provider Pratima Shayna   3/5/2018 2:00 PM Wilma Lemus,  [facility] [address]   7/17/2018 11:00 AM Silvana Cates MD Vallerie Rover [facility]         Diabetic Medication Protocol Failed1/29 3:25 PM   Microalbumin procedure in past 12 months or taking ACE/ARB     -unable to send as it failed protocol, mediation pended.

## 2018-01-29 NOTE — TELEPHONE ENCOUNTER
METFORMIN HCL 1000 MG Tablet  Will file in chart as: METFORMIN HCL 1000 MG Oral Tab  TAKE 1 TABLET TWICE DAILY WITH MEALS       Disp: 180 tablet Refills: 3    Class: Normal Start: 1/27/2018   Originally ordered: 1 year ago by Soniya Funez MD  Last refill: 11/25/2017  Diabetic Medication Protocol Failed1/27 1:51 PM   Microalbumin procedure in past 12 months or taking ACE/ARB    HgBA1C procedure resulted in past 6 months    Last HgBA1C < 7.5    Appointment in past 6 or next 3 months       Requesting metformin  LOV: 12/4/17  RTC: 3 months  Last Relevant Labs: 12/4/17 (A1c 6.8) (microalbumin 12/14/16)  Filled: 7/11/17 #10 with 0 refills while waiting for mail order to arrive            3/3/17 #180 with 3 refills    Future Appointments  Date Time Provider Pratima Merchanti   3/5/2018 2:00 PM Wilma Lemus DO [facility] [address] [facility] [address]   7/17/2018 11:00 AM Amira Cates MD Marga Bandy [facility]       Does not pass protocol. Pended if okay to refill #180 with 1 refill.

## 2018-02-12 RX ORDER — OXYBUTYNIN CHLORIDE 5 MG/1
5 TABLET ORAL 4 TIMES DAILY
Qty: 120 TABLET | Refills: 5 | OUTPATIENT
Start: 2018-02-12 | End: 2018-08-11

## 2018-02-12 RX ORDER — RIVASTIGMINE TARTRATE 6 MG/1
6 CAPSULE ORAL 2 TIMES DAILY
Qty: 60 CAPSULE | Refills: 12 | OUTPATIENT
Start: 2018-02-12

## 2018-02-12 NOTE — TELEPHONE ENCOUNTER
Requesting: Oxybutynin 5mg an Rivastigmine   LOV: 12/4/17  RTC: 3 months  Last Relevant Labs: -  Filled: 7/10/17 #120 with 5 refills - Oxybutynin 5mg, given by Dr. Susan Griffin: 8/8/17 #60 with 12 refills - Rivastigmine 6mg , given by Dr. Ruslan Saleem

## 2018-02-21 ENCOUNTER — OFFICE VISIT (OUTPATIENT)
Dept: FAMILY MEDICINE CLINIC | Facility: CLINIC | Age: 78
End: 2018-02-21

## 2018-02-21 VITALS
RESPIRATION RATE: 16 BRPM | TEMPERATURE: 97 F | HEIGHT: 62 IN | HEART RATE: 72 BPM | SYSTOLIC BLOOD PRESSURE: 110 MMHG | BODY MASS INDEX: 25.95 KG/M2 | DIASTOLIC BLOOD PRESSURE: 70 MMHG | WEIGHT: 141 LBS

## 2018-02-21 DIAGNOSIS — E11.22 TYPE 2 DIABETES MELLITUS WITH STAGE 2 CHRONIC KIDNEY DISEASE, WITHOUT LONG-TERM CURRENT USE OF INSULIN (HCC): ICD-10-CM

## 2018-02-21 DIAGNOSIS — F02.80 LATE ONSET ALZHEIMER'S DISEASE WITHOUT BEHAVIORAL DISTURBANCE (HCC): ICD-10-CM

## 2018-02-21 DIAGNOSIS — G30.1 LATE ONSET ALZHEIMER'S DISEASE WITHOUT BEHAVIORAL DISTURBANCE (HCC): ICD-10-CM

## 2018-02-21 DIAGNOSIS — R92.8 ABNORMAL MAMMOGRAM OF LEFT BREAST: ICD-10-CM

## 2018-02-21 DIAGNOSIS — N18.2 TYPE 2 DIABETES MELLITUS WITH STAGE 2 CHRONIC KIDNEY DISEASE, WITHOUT LONG-TERM CURRENT USE OF INSULIN (HCC): ICD-10-CM

## 2018-02-21 DIAGNOSIS — N39.41 URGE INCONTINENCE OF URINE: ICD-10-CM

## 2018-02-21 DIAGNOSIS — R51.9 ACUTE NONINTRACTABLE HEADACHE, UNSPECIFIED HEADACHE TYPE: Primary | ICD-10-CM

## 2018-02-21 LAB
GLUCOSE BLOOD: 120
POC ALBUMIN, UR: 10 MG/DL (ref 0–19)
POC ALBUMIN/CREAT RATIO, UR: 30 MG/G (ref 0–29)
POC CREATININE,URINE: 200 MG/DL (ref 10–300)
TEST STRIP LOT #: ABNORMAL NUMERIC
TEST STRIP LOT #: NORMAL NUMERIC

## 2018-02-21 PROCEDURE — 82962 GLUCOSE BLOOD TEST: CPT | Performed by: FAMILY MEDICINE

## 2018-02-21 PROCEDURE — 99214 OFFICE O/P EST MOD 30 MIN: CPT | Performed by: FAMILY MEDICINE

## 2018-02-21 RX ORDER — OXYBUTYNIN CHLORIDE 5 MG/1
5 TABLET ORAL 3 TIMES DAILY
Qty: 30 TABLET | Refills: 0 | Status: CANCELLED | OUTPATIENT
Start: 2018-02-21 | End: 2018-05-22

## 2018-02-21 RX ORDER — OXYBUTYNIN CHLORIDE 5 MG/1
5 TABLET ORAL 3 TIMES DAILY
Qty: 90 TABLET | Refills: 2 | Status: SHIPPED | OUTPATIENT
Start: 2018-02-21 | End: 2018-02-22

## 2018-02-21 NOTE — PROGRESS NOTES
HPI:   Bari Deng is a 68year old female with hx of Dm2, HLD, and dementia that presents for headache for 2 months. Pain is intermittent, located in frontal sinuses b/l. Occurs 3-4 times per week. Pain is 6/10 at worst, dull ache.   Resolves with tyl Oral Cap, Take 1 capsule (6 mg total) by mouth 2 (two) times daily. , Disp: 60 capsule, Rfl: 12  •  Cholecalciferol (VITAMIN D) 1000 UNITS Oral Tab, Take 1,000 Units by mouth daily. , Disp: , Rfl:   •  Oxybutynin Chloride 5 MG Oral Tab, Take 1 tablet (5 mg t meals and change to water, can add drink mix like sugar free crystal light for flavor  - can continue tylenol PRN, and f/u in 1-2 weeks or sooner if not improving  - will d/w her Neurologist if not resolving      2.  Type 2 diabetes mellitus with stage 2 ch

## 2018-02-21 NOTE — PATIENT INSTRUCTIONS
Stop juice with meals  Increase water intake  Follow up in 2 weeks or sooner if not improving      Self-Care for Headaches  Most headaches aren't serious and can be relieved with self-care.  But some headaches may be a sign of another health problem like ey both sides of your head  · Nausea or vomiting  · Extreme sensitivity to light, sound, and smells  · Bright spots, flashes, or other visual changes  · Pain or nausea so severe that you can't continue your daily activities  Call your healthcare provider   If

## 2018-02-22 ENCOUNTER — TELEPHONE (OUTPATIENT)
Dept: FAMILY MEDICINE CLINIC | Facility: CLINIC | Age: 78
End: 2018-02-22

## 2018-02-22 RX ORDER — OXYBUTYNIN CHLORIDE 5 MG/1
5 TABLET ORAL 3 TIMES DAILY
Qty: 90 TABLET | Refills: 0 | Status: SHIPPED | OUTPATIENT
Start: 2018-02-22 | End: 2018-06-07

## 2018-02-22 NOTE — TELEPHONE ENCOUNTER
Pt was here yesterday and received a script for Oxybutynin Chloride 5 MG Oral Tab which was sent to mail delivery, however, pt's daughter said the script would also be sent to [facility] on [address] in [place].     Please submit a 30 day supply to [facility]

## 2018-02-25 PROBLEM — R73.9 HYPERGLYCEMIA: Status: RESOLVED | Noted: 2017-09-06 | Resolved: 2018-02-25

## 2018-02-25 PROBLEM — F02.80 LATE ONSET ALZHEIMER'S DISEASE WITHOUT BEHAVIORAL DISTURBANCE (HCC): Status: ACTIVE | Noted: 2018-02-25

## 2018-02-25 PROBLEM — R07.9 ACUTE CHEST PAIN: Status: RESOLVED | Noted: 2017-09-06 | Resolved: 2018-02-25

## 2018-02-25 PROBLEM — R06.00 DYSPNEA, UNSPECIFIED TYPE: Status: RESOLVED | Noted: 2017-09-06 | Resolved: 2018-02-25

## 2018-02-25 PROBLEM — G30.1 LATE ONSET ALZHEIMER'S DISEASE WITHOUT BEHAVIORAL DISTURBANCE (HCC): Status: ACTIVE | Noted: 2018-02-25

## 2018-03-05 ENCOUNTER — OFFICE VISIT (OUTPATIENT)
Dept: FAMILY MEDICINE CLINIC | Facility: CLINIC | Age: 78
End: 2018-03-05

## 2018-03-05 VITALS
DIASTOLIC BLOOD PRESSURE: 70 MMHG | SYSTOLIC BLOOD PRESSURE: 108 MMHG | WEIGHT: 141 LBS | HEIGHT: 62 IN | TEMPERATURE: 98 F | BODY MASS INDEX: 25.95 KG/M2 | RESPIRATION RATE: 14 BRPM | HEART RATE: 70 BPM

## 2018-03-05 DIAGNOSIS — F02.80 LATE ONSET ALZHEIMER'S DISEASE WITHOUT BEHAVIORAL DISTURBANCE (HCC): ICD-10-CM

## 2018-03-05 DIAGNOSIS — N39.41 URGE INCONTINENCE OF URINE: ICD-10-CM

## 2018-03-05 DIAGNOSIS — Z23 NEED FOR SHINGLES VACCINE: ICD-10-CM

## 2018-03-05 DIAGNOSIS — E78.5 HYPERLIPIDEMIA, UNSPECIFIED HYPERLIPIDEMIA TYPE: ICD-10-CM

## 2018-03-05 DIAGNOSIS — E53.8 LOW VITAMIN B12 LEVEL: ICD-10-CM

## 2018-03-05 DIAGNOSIS — N18.2 TYPE 2 DIABETES MELLITUS WITH STAGE 2 CHRONIC KIDNEY DISEASE, WITHOUT LONG-TERM CURRENT USE OF INSULIN (HCC): ICD-10-CM

## 2018-03-05 DIAGNOSIS — M85.80 OSTEOPENIA, UNSPECIFIED LOCATION: ICD-10-CM

## 2018-03-05 DIAGNOSIS — E55.9 VITAMIN D DEFICIENCY: ICD-10-CM

## 2018-03-05 DIAGNOSIS — Z98.890 S/P LEFT BREAST BIOPSY: ICD-10-CM

## 2018-03-05 DIAGNOSIS — Z00.00 ENCOUNTER FOR ANNUAL HEALTH EXAMINATION: Primary | ICD-10-CM

## 2018-03-05 DIAGNOSIS — K75.81 NASH (NONALCOHOLIC STEATOHEPATITIS): ICD-10-CM

## 2018-03-05 DIAGNOSIS — E11.22 TYPE 2 DIABETES MELLITUS WITH STAGE 2 CHRONIC KIDNEY DISEASE, WITHOUT LONG-TERM CURRENT USE OF INSULIN (HCC): ICD-10-CM

## 2018-03-05 DIAGNOSIS — G30.1 LATE ONSET ALZHEIMER'S DISEASE WITHOUT BEHAVIORAL DISTURBANCE (HCC): ICD-10-CM

## 2018-03-05 DIAGNOSIS — R92.8 ABNORMAL MAMMOGRAM OF LEFT BREAST: ICD-10-CM

## 2018-03-05 PROBLEM — N63.10 BREAST MASS, RIGHT: Status: RESOLVED | Noted: 2017-05-04 | Resolved: 2018-03-05

## 2018-03-05 PROBLEM — Z80.3 FAMILY HISTORY OF BREAST CANCER IN SISTER: Status: RESOLVED | Noted: 2017-05-04 | Resolved: 2018-03-05

## 2018-03-05 LAB
CARTRIDGE LOT#: 799 NUMERIC
HEMOGLOBIN A1C: 5.7 % (ref 4.3–5.6)

## 2018-03-05 PROCEDURE — G0438 PPPS, INITIAL VISIT: HCPCS | Performed by: FAMILY MEDICINE

## 2018-03-05 PROCEDURE — 83036 HEMOGLOBIN GLYCOSYLATED A1C: CPT | Performed by: FAMILY MEDICINE

## 2018-03-05 PROCEDURE — 90750 HZV VACC RECOMBINANT IM: CPT | Performed by: FAMILY MEDICINE

## 2018-03-05 PROCEDURE — 90471 IMMUNIZATION ADMIN: CPT | Performed by: FAMILY MEDICINE

## 2018-03-05 PROCEDURE — 96160 PT-FOCUSED HLTH RISK ASSMT: CPT | Performed by: FAMILY MEDICINE

## 2018-03-05 NOTE — PATIENT INSTRUCTIONS
1) Schedule Mammogram  [facility] Scheduling [phone]) and then follow up with Dr. Carina West (surgery)    2) Schedule Bone Scan [facility] Scheduling [phone])    3) STOP glipizide, recheck A1c in 3 months      Thank you for allowing me to participate in y Cholesterol, Total (mg/dL)   Date Value   09/07/2017 178     TRIGLYCERIDES (mg/dL)   Date Value   05/01/2015 139     Triglycerides (mg/dL)   Date Value   09/07/2017 128        EKG - covered if needed at Welcome to Medicare, and non-screening if indicat 01/30/2014    No flowsheet data found.     Recommended for 2 year anniversary or as ordered in After Visit Summary   Pap and Pelvic      Pap: Every 3 yrs age 21-65 or Pap+HPV every 5 yrs age 33-67, Covered every 2 yrs up to age 79 or Yearly if High Risk   T does not cover unless Medically needed    Zoster (Not covered by Medicare Part B) No orders found for this or any previous visit. This may be covered with your pharmacy  prescription benefits     Recommended Websites for Advanced Directives    [email or web address]. i

## 2018-03-05 NOTE — PROGRESS NOTES
HPI:   Maddie Boyer is a 68year old female who presents for a MA (Medicare Advantage) [facility] (Once per calendar year).     Her last annual assessment has been over 1 year: Annual Physical due on 02/13/2018       Fall/Risk Assessment abnormal    She has some help   She has difficulties Affording Meds based on screening of functional status. Are you able to afford your medications?: No     She has Vision problems based on screening of functional status.    Vision Problems? : Yes         She has problems w accidents      Osteopenia - due for DEXA     PORTILLO (nonalcoholic steatohepatitis) - LFTs trending down     Abnormal mammogram of left breast, S/P left breast biopsy - bx benign 5/2017, due for 6 month follow mammogram and surgery consult      Late onset Alz past medical history of Abnormal mammogram of left breast (5/4/2017); Breast mass, right (5/4/2017); Breast mass, right (5/4/2017); Dementia; Hyperlipidemia (6/29/2012);  Late onset Alzheimer's disease without behavioral disturbance (2/25/2018); PORTILLO (nonal Temp 98.4 °F (36.9 °C) (Oral)   Resp 14   Ht 62\"   Wt 141 lb   BMI 25.79 kg/m²  Estimated body mass index is 25.79 kg/m² as calculated from the following:    Height as of this encounter: 62\". Weight as of this encounter: 141 lb.     Medicare Hearing As Adjuvanted (Shingrix) 03/05/2018        ASSESSMENT AND OTHER RELEVANT CHRONIC CONDITIONS:   Nataly Kramer is a 68year old female who presents for a Medicare Assessment.      PLAN SUMMARY:   Diagnoses and all orders for this visit:    Encounter for annual hea energy level?: Daily Walks  How would you describe your daily physical activity?: Light  How would you describe your current health state?: Fair  How do you maintain positive mental well-being?: Social Interaction      This section provided for quick revie results found for: CHLAMYDIA No flowsheet data found. Screening Mammogram      Mammogram Annually to 76, then as discussed There are no preventive care reminders to display for this patient.  Update Health Maintenance if applicable     Immunizations (Verónica Luz [address] [[id number]]

## 2018-03-30 RX ORDER — CALCIUM CITRATE/VITAMIN D3 200MG-6.25
1 TABLET ORAL 2 TIMES DAILY
Qty: 200 STRIP | Refills: 0 | Status: SHIPPED | OUTPATIENT
Start: 2018-03-30 | End: 2018-08-04

## 2018-03-30 NOTE — TELEPHONE ENCOUNTER
Diabetic Supplies Protocol Passed3/30 10:40 AM   Appointment in the past 12 or next 3 months     RX passes protocol. RX sent.

## 2018-04-03 RX ORDER — GLIPIZIDE 5 MG/1
TABLET ORAL
Qty: 180 TABLET | Refills: 0 | Status: SHIPPED | OUTPATIENT
Start: 2018-04-03 | End: 2018-08-20

## 2018-04-03 NOTE — TELEPHONE ENCOUNTER
Diabetic Medication Protocol Passed  Requesting Glipizide 5mg and Metformin 1000mg  LOV: 3/5/18  RTC: 3 mos  Last Labs: 2/21/18  Filled: Metformin 1/29/18 #180 with 0 refills  Glipizide 1/29/18 #180 with 0 refills    Future Appointments  Date Time Provider

## 2018-04-25 NOTE — TELEPHONE ENCOUNTER
Requesting Oxybutynin   LOV: 3/5/18  RTC: 3 months   Last Relevant Labs: n/a   Filled: 2/22/18 #90 with 0 refills    Future Appointments  Date Time Provider Pratima Corral   7/17/2018 11:00 AM Marii Cates MD Danella Boers [facility]     Pended if okay.

## 2018-04-26 RX ORDER — OXYBUTYNIN CHLORIDE 5 MG/1
TABLET ORAL
Qty: 270 TABLET | Refills: 1 | Status: CANCELLED | OUTPATIENT
Start: 2018-04-26

## 2018-04-26 NOTE — TELEPHONE ENCOUNTER
Patient got oxybutnin 90 pills with 2 refills on 2/21/18 to mail order and also 90 pills to local pharmacy, should have pills until 6/21/18.       Natalee Reagan, DO  Family Medicine

## 2018-05-07 ENCOUNTER — TELEPHONE (OUTPATIENT)
Dept: FAMILY MEDICINE CLINIC | Facility: CLINIC | Age: 78
End: 2018-05-07

## 2018-05-07 RX ORDER — OXYBUTYNIN CHLORIDE 5 MG/1
5 TABLET ORAL 3 TIMES DAILY
Qty: 90 TABLET | Refills: 0 | Status: CANCELLED | OUTPATIENT
Start: 2018-05-07 | End: 2018-08-05

## 2018-05-07 NOTE — TELEPHONE ENCOUNTER
See note from 4/25/18 on this same topic, should have pills till 6/21 from what I can tell.      Arash Wise, DO  Family Medicine

## 2018-05-07 NOTE — TELEPHONE ENCOUNTER
Patient would like new prescription written for Oxybutin Chloride 5 mg sent to the [facility] mail order pharmacy. This was previously written by Dr. Irina Justin, urologist, who patient does not need to see anymore.

## 2018-05-07 NOTE — TELEPHONE ENCOUNTER
Requesting Oxybutin Chloride 5 mg  LOV: 3/5/18  Urge incontinence of urine  - continue oxybutnin   RTC: 3 mos  Last Labs: n/a  Filled: 2/22/18#90 with 0 refills    Future Appointments  Date Time Provider Pratima Corral   7/17/2018 11:00 AM Nery Valadez

## 2018-05-07 NOTE — TELEPHONE ENCOUNTER
Spoke to pts daughter and clarified that pt should still have enough medications. Daughter states they never picked up prescription at [facility] [place] so she only received the mail order prescription.  Advised daughter to contact [facility] [place] for a r

## 2018-05-08 NOTE — TELEPHONE ENCOUNTER
Requesting Oxybutynin   LOV: 3/5/18  RTC: 3 months   Last Relevant Labs: n/a   Filled: 2/22/18 #90 with 0 refills    Future Appointments  Date Time Provider Pratima Corral   7/17/2018 11:00 AM Jose Cates MD Koreen Sleeper [facility]       Hold for pa

## 2018-05-10 RX ORDER — OXYBUTYNIN CHLORIDE 5 MG/1
TABLET ORAL
Qty: 90 TABLET | Refills: 0 | OUTPATIENT
Start: 2018-05-10

## 2018-05-10 NOTE — TELEPHONE ENCOUNTER
Daughter said her mother gave the name of wrong med for refill and said to disregard. She is not in need of any refills and apologized for the error. I explained it is no problem. Order sent back to pharmacy and error in request and denied.

## 2018-05-31 RX ORDER — ATORVASTATIN CALCIUM 20 MG/1
TABLET, FILM COATED ORAL
Qty: 90 TABLET | Refills: 0 | Status: SHIPPED | OUTPATIENT
Start: 2018-05-31 | End: 2018-08-06

## 2018-06-07 NOTE — TELEPHONE ENCOUNTER
Requesting Oxybutynin and metformin  LOV: 3/5/18  RTC: 3 months  Last Relevant Labs: 3/5/18  Filled: 4/3/18 #180 with 0 refills  Metformin  Filled: 2/22/18 #90 with 0 refills  Oxybutynin    Diabetic Medication Protocol Passed6/7 3:10 PM       Future Appoin

## 2018-06-08 ENCOUNTER — TELEPHONE (OUTPATIENT)
Dept: FAMILY MEDICINE CLINIC | Facility: CLINIC | Age: 78
End: 2018-06-08

## 2018-06-08 ENCOUNTER — TELEPHONE (OUTPATIENT)
Dept: SURGERY | Facility: CLINIC | Age: 78
End: 2018-06-08

## 2018-06-08 DIAGNOSIS — Z01.00 DIABETIC EYE EXAM (HCC): Primary | ICD-10-CM

## 2018-06-08 DIAGNOSIS — E11.9 TYPE 2 DIABETES MELLITUS WITHOUT COMPLICATION, WITHOUT LONG-TERM CURRENT USE OF INSULIN (HCC): ICD-10-CM

## 2018-06-08 DIAGNOSIS — E11.9 DIABETIC EYE EXAM (HCC): Primary | ICD-10-CM

## 2018-06-08 DIAGNOSIS — Z98.890 S/P BREAST BIOPSY, LEFT: Primary | ICD-10-CM

## 2018-06-08 NOTE — TELEPHONE ENCOUNTER
Pt calling states mammo has . Requesting order be put in again  mammo reordered as klades initial order.

## 2018-06-08 NOTE — TELEPHONE ENCOUNTER
Referral placed for routine diabetic eye exam   LOV: 3/5/18  RTC: 3 months  Last eye exam: 11/16/15 media

## 2018-06-08 NOTE — TELEPHONE ENCOUNTER
Patient would like an order for a mammogram placed in the system. Please call patient when complete.

## 2018-06-08 NOTE — TELEPHONE ENCOUNTER
Requesting mammogram  LOV: 3/5/18  RTC: 3 months  Last Relevant Labs: 5/9/17 stereotactic biopsy  Future Appointments  Date Time Provider Pratima Shayna   6/11/2018 10:40 AM GAMALIEL DEXLAISHA [address]   7/17/2018 11:00 AM Justo Cates MD [facility]

## 2018-06-08 NOTE — TELEPHONE ENCOUNTER
Jenna, [address] [place] [address]  P [facility] Clinical Staff   Cc: Kelsie Dennis [place] [facility] [place]   Phone Number: [phone]             Patient Name: Mary Kate Dent   : 40   Reason for the order/referral: Diabetic eye exam   PCP: Kimberly Powell,   Refer to Provider

## 2018-06-11 ENCOUNTER — HOSPITAL ENCOUNTER (OUTPATIENT)
Dept: BONE DENSITY | Age: 78
Discharge: HOME | End: 2018-06-11
Attending: FAMILY MEDICINE
Payer: MEDICARE

## 2018-06-11 DIAGNOSIS — M85.80 OSTEOPENIA, UNSPECIFIED LOCATION: ICD-10-CM

## 2018-06-11 DIAGNOSIS — M85.80 OSTEOPENIA, UNSPECIFIED LOCATION: Primary | ICD-10-CM

## 2018-06-11 PROCEDURE — 77080 DXA BONE DENSITY AXIAL: CPT | Performed by: FAMILY MEDICINE

## 2018-06-11 RX ORDER — GLUCOSAM/CHON-MSM1/C/MANG/BOSW 500-416.6
1 TABLET ORAL 2 TIMES DAILY
Qty: 200 EACH | Refills: 0 | Status: SHIPPED | OUTPATIENT
Start: 2018-06-11 | End: 2018-08-13

## 2018-06-11 RX ORDER — OXYBUTYNIN CHLORIDE 5 MG/1
TABLET ORAL
Qty: 270 TABLET | Refills: 1 | Status: SHIPPED | OUTPATIENT
Start: 2018-06-11 | End: 2018-11-14

## 2018-06-11 NOTE — TELEPHONE ENCOUNTER
Requesting: TruePlus 28G lancet  LOV: 3/5/18  RTC: 3 months  Last Relevant Labs: 3/5/18: A1C: 5.7  Filled: -    Future Appointments  Date Time Provider Pratima Corral   6/25/2018 1:20 PM [facility] RM1 [facility]   7/17/2018 11:00 AM Neel Lee

## 2018-06-25 ENCOUNTER — HOSPITAL ENCOUNTER (OUTPATIENT)
Dept: MAMMOGRAPHY | Facility: HOSPITAL | Age: 78
Discharge: HOME | End: 2018-06-25
Attending: SURGERY
Payer: MEDICARE

## 2018-06-25 DIAGNOSIS — Z98.890 S/P BREAST BIOPSY, LEFT: ICD-10-CM

## 2018-06-25 PROCEDURE — 77066 DX MAMMO INCL CAD BI: CPT | Performed by: SURGERY

## 2018-06-25 PROCEDURE — 77062 BREAST TOMOSYNTHESIS BI: CPT | Performed by: SURGERY

## 2018-06-26 ENCOUNTER — APPOINTMENT (OUTPATIENT)
Dept: LAB | Age: 78
End: 2018-06-26
Attending: FAMILY MEDICINE
Payer: MEDICARE

## 2018-06-26 DIAGNOSIS — E53.8 LOW VITAMIN B12 LEVEL: ICD-10-CM

## 2018-06-26 DIAGNOSIS — E55.9 VITAMIN D DEFICIENCY: ICD-10-CM

## 2018-06-26 DIAGNOSIS — K75.81 NASH (NONALCOHOLIC STEATOHEPATITIS): ICD-10-CM

## 2018-06-26 PROCEDURE — 82607 VITAMIN B-12: CPT

## 2018-06-26 PROCEDURE — 80053 COMPREHEN METABOLIC PANEL: CPT

## 2018-06-26 PROCEDURE — 82306 VITAMIN D 25 HYDROXY: CPT

## 2018-06-26 PROCEDURE — 36415 COLL VENOUS BLD VENIPUNCTURE: CPT

## 2018-07-10 ENCOUNTER — OFFICE VISIT (OUTPATIENT)
Dept: FAMILY MEDICINE CLINIC | Facility: CLINIC | Age: 78
End: 2018-07-10

## 2018-07-10 VITALS
RESPIRATION RATE: 16 BRPM | TEMPERATURE: 98 F | WEIGHT: 137 LBS | HEART RATE: 68 BPM | BODY MASS INDEX: 25.21 KG/M2 | SYSTOLIC BLOOD PRESSURE: 122 MMHG | DIASTOLIC BLOOD PRESSURE: 72 MMHG | HEIGHT: 62 IN

## 2018-07-10 DIAGNOSIS — R30.0 DYSURIA: Primary | ICD-10-CM

## 2018-07-10 LAB
BILIRUBIN: NEGATIVE
GLUCOSE (URINE DIPSTICK): 250 MG/DL
KETONES (URINE DIPSTICK): NEGATIVE MG/DL
MULTISTIX LOT#: NORMAL NUMERIC
NITRITE, URINE: NEGATIVE
PH, URINE: 7 (ref 4.5–8)
PROTEIN (URINE DIPSTICK): NEGATIVE MG/DL
SPECIFIC GRAVITY: 1.02 (ref 1–1.03)
URINE-COLOR: YELLOW
UROBILINOGEN,SEMI-QN: 0.2 MG/DL (ref 0–1.9)

## 2018-07-10 PROCEDURE — 87077 CULTURE AEROBIC IDENTIFY: CPT | Performed by: PHYSICIAN ASSISTANT

## 2018-07-10 PROCEDURE — 87086 URINE CULTURE/COLONY COUNT: CPT | Performed by: PHYSICIAN ASSISTANT

## 2018-07-10 PROCEDURE — 99213 OFFICE O/P EST LOW 20 MIN: CPT | Performed by: PHYSICIAN ASSISTANT

## 2018-07-10 PROCEDURE — 81003 URINALYSIS AUTO W/O SCOPE: CPT | Performed by: PHYSICIAN ASSISTANT

## 2018-07-10 RX ORDER — SULFAMETHOXAZOLE AND TRIMETHOPRIM 800; 160 MG/1; MG/1
1 TABLET ORAL 2 TIMES DAILY
Qty: 14 TABLET | Refills: 0 | Status: SHIPPED | OUTPATIENT
Start: 2018-07-10 | End: 2018-07-13

## 2018-07-10 NOTE — PROGRESS NOTES
[facility] Internal Medicine Progress Note    CC:  Patient presents with:  Dysuria: x 2-3 weeks      HPI:   HPI  Dysuria  She states she has been going to the bathroom more frequently   She has had burning on and off with urination  She states it Systems   Constitutional: Negative for chills and fever. Respiratory: Negative for cough and shortness of breath. Cardiovascular: Negative for chest pain. Gastrointestinal: Negative for nausea and vomiting.    Genitourinary: Positive for dysuria, kayden daily.           Imaging & Consults:  None     Patient/Caregiver Education: Patient/Caregiver Education: There are no barriers to learning. Medical education done. Outcome: Patient verbalizes understanding.     Problem List:  Patient Active Problem List:

## 2018-07-10 NOTE — PATIENT INSTRUCTIONS
Thank you for choosing [facility] at [facility]  To Do: Rhys Casiano  1. Pt to begin medications as prescribed  2. OTC Vitamin D, 2,000 IU daily  3.  Follow-up if symptoms persist or increase    • Please signup for MY CHART, which is el insurance company approved your testing, please call Central Scheduling at [phone]  Please allow our office 5 business days to contact you regarding any testing results.     Refill policies:   Allow 3 business days for refills; controlled substances ma

## 2018-07-13 ENCOUNTER — TELEPHONE (OUTPATIENT)
Dept: FAMILY MEDICINE CLINIC | Facility: CLINIC | Age: 78
End: 2018-07-13

## 2018-07-13 RX ORDER — NITROFURANTOIN 25; 75 MG/1; MG/1
100 CAPSULE ORAL 2 TIMES DAILY
Qty: 14 CAPSULE | Refills: 0 | Status: SHIPPED | OUTPATIENT
Start: 2018-07-13 | End: 2018-07-20

## 2018-07-13 NOTE — TELEPHONE ENCOUNTER
She has been on bactrim before without problem, but we can change to macrobid and hope that covers, because pt is allergic to penicillin  Macrobid 100mg BID x 7 days

## 2018-07-13 NOTE — TELEPHONE ENCOUNTER
Diomedes Salazar informed of all. She will have mom stop bactrim and try macrobid. Rx sent to [facility]. Informed Diomedes Salazar to seek treatment if her mother's symptoms worsen or do not improve.

## 2018-07-13 NOTE — TELEPHONE ENCOUNTER
Pt started taking Bactrum on Tuesday. Pt started throwing up on Thursday. Do you think it could be from the medication. Please advise.

## 2018-07-13 NOTE — TELEPHONE ENCOUNTER
Patient seen 7/10/18  Assessment and Plan:  Dysuria  (primary encounter diagnosis)  UA in office + leukocytes  Will send to culture  Pt to begin bactrim  If symptoms persist or increase pt to call office   Culture     >100,000 CFU/ML Alpha hemolytic Strept

## 2018-08-06 RX ORDER — CALCIUM CITRATE/VITAMIN D3 200MG-6.25
TABLET ORAL
Qty: 200 STRIP | Refills: 0 | Status: SHIPPED | OUTPATIENT
Start: 2018-08-06 | End: 2018-10-10

## 2018-08-06 NOTE — TELEPHONE ENCOUNTER
Future Appointments  Date Time Provider [facility] Shayna   8/20/2018 2:00 PM Brenda Lemus,  [facility] [address] [facility] [address]   7/16/2019 11:00 AM Kirstin Cates MD Vernona Stade [facility]

## 2018-08-06 NOTE — TELEPHONE ENCOUNTER
TRUE METRIX SELF MONITORING BLOOD GLUCOSE STRIPS Strip  Will file in chart as: TRUE METRIX BLOOD GLUCOSE TEST In Vitro Strip  TEST BLOOD GLUCOSE TWICE DAILY  Disp: 200 strip Refills: 2    Class: Normal Start: 8/4/2018   Originally ordered: 4 months ago by

## 2018-08-07 RX ORDER — ATORVASTATIN CALCIUM 20 MG/1
TABLET, FILM COATED ORAL
Qty: 90 TABLET | Refills: 0 | Status: SHIPPED | OUTPATIENT
Start: 2018-08-07 | End: 2018-08-20

## 2018-08-07 NOTE — TELEPHONE ENCOUNTER
Requested Medications   ATORVASTATIN CALCIUM 20 MG Tablet  Will file in chart as: ATORVASTATIN 20 MG Oral Tab  TAKE 1 TABLET EVERY NIGHT       Disp: 90 tablet Refills: 0    Class: Normal Start: 8/6/2018   Documented:8 months ago  Last refill: 6/4/2018  Cho

## 2018-08-13 RX ORDER — GLUCOSAM/CHON-MSM1/C/MANG/BOSW 500-416.6
1 TABLET ORAL 2 TIMES DAILY
Qty: 200 EACH | Refills: 0 | Status: SHIPPED | OUTPATIENT
Start: 2018-08-13 | End: 2018-10-17

## 2018-08-13 NOTE — TELEPHONE ENCOUNTER
METFORMIN HCL 1000 MG Tablet  Will file in chart as: METFORMIN HCL 1000 MG Oral Tab  Take 1 tablet (1,000 mg total) by mouth 2 (two) times daily with meals.  Need appointment for furthers       Disp: 180 tablet Refills: 0    Class: Normal Start: 8/13/2018

## 2018-08-20 ENCOUNTER — OFFICE VISIT (OUTPATIENT)
Dept: FAMILY MEDICINE CLINIC | Facility: CLINIC | Age: 78
End: 2018-08-20
Payer: COMMERCIAL

## 2018-08-20 VITALS
RESPIRATION RATE: 18 BRPM | OXYGEN SATURATION: 98 % | BODY MASS INDEX: 25.03 KG/M2 | HEART RATE: 79 BPM | HEIGHT: 62 IN | DIASTOLIC BLOOD PRESSURE: 60 MMHG | SYSTOLIC BLOOD PRESSURE: 116 MMHG | WEIGHT: 136 LBS

## 2018-08-20 DIAGNOSIS — N18.2 TYPE 2 DIABETES MELLITUS WITH STAGE 2 CHRONIC KIDNEY DISEASE, WITHOUT LONG-TERM CURRENT USE OF INSULIN (HCC): Primary | ICD-10-CM

## 2018-08-20 DIAGNOSIS — E11.22 TYPE 2 DIABETES MELLITUS WITH STAGE 2 CHRONIC KIDNEY DISEASE, WITHOUT LONG-TERM CURRENT USE OF INSULIN (HCC): Primary | ICD-10-CM

## 2018-08-20 DIAGNOSIS — E78.2 MIXED HYPERLIPIDEMIA: ICD-10-CM

## 2018-08-20 LAB
CARTRIDGE LOT#: 865 NUMERIC
HEMOGLOBIN A1C: 8.8 % (ref 4.3–5.6)

## 2018-08-20 PROCEDURE — 83036 HEMOGLOBIN GLYCOSYLATED A1C: CPT | Performed by: FAMILY MEDICINE

## 2018-08-20 PROCEDURE — 99214 OFFICE O/P EST MOD 30 MIN: CPT | Performed by: FAMILY MEDICINE

## 2018-08-20 RX ORDER — GLIPIZIDE 2.5 MG/1
2.5 TABLET, EXTENDED RELEASE ORAL
Qty: 90 TABLET | Refills: 1 | Status: SHIPPED | OUTPATIENT
Start: 2018-08-20 | End: 2019-01-25

## 2018-08-20 RX ORDER — ATORVASTATIN CALCIUM 20 MG/1
TABLET, FILM COATED ORAL
Qty: 90 TABLET | Refills: 2 | Status: SHIPPED | OUTPATIENT
Start: 2018-11-07 | End: 2019-04-20

## 2018-08-20 NOTE — PATIENT INSTRUCTIONS
Constipation  Colace or miralax daily until bowel movements are soft and regular, then can use as needed  Continue good hydration and high fiber diet    Diabetes  Continue metformin  Restart glipizide 2.5 mg daily  Avoid juice       Healthy Meals for Diabe casserole  · 6 chicken nuggets  · 2-inch-square brownie or cake without frosting  · 2 small cookies  · 1/2 cup of ice cream or sherbet  Choose healthy protein foods  Eating protein that is low in fat can help you control your weight.  It also helps keep you about foods with added sugar and fats. But there are many other options for healthier snack choices.  Here are a few snack ideas to choose from:  Snacks with less than 5 grams of carbohydrates  · 1 piece of string cheese  · 3 celery sticks plus 1 tablespoon common. At some point in life it affects almost everyone. Since everyone's bowel habits are different, what is constipation to one person may not be to another. Your healthcare provider may do tests to diagnose constipation.  It depends on what he or she fi and vegetables, and reduce dairy intake, meats, and processed foods  · Fluids. It's important to get enough fluids each day. Drink plenty of water when you eat more fiber.  If you are on diet that limits the amount of fluid you can have, talk about this wit

## 2018-08-20 NOTE — PROGRESS NOTES
HPI:   Aditya Carreno is a 66year old female that presents for follow-up diabetes. A1c previously stable 6.8-->5.7-->now 8.8. Patient has history of dementia and is intermittently compliant with diabetic diet.   She does have CRNA's who put out her medica apply Misc, 1 lancet by Finger stick route 2 (two) times daily. , Disp: 200 each, Rfl: 0  •  TRUE METRIX BLOOD GLUCOSE TEST In Vitro Strip, TEST BLOOD GLUCOSE TWICE DAILY, Disp: 200 strip, Rfl: 0  •  Rivastigmine Tartrate 6 MG Oral Cap, Take 1 capsule (6 mg glipizide 2.5 mg XL slowly and increase as tolerated given pt age  - discussed avoiding juice and sweets as much as possible, but pts dementia makes dietary modifications difficult. She is compliant with meds. - COMP METABOLIC PANEL (14);  Future  - LIP

## 2018-10-10 RX ORDER — CALCIUM CITRATE/VITAMIN D3 200MG-6.25
TABLET ORAL
Qty: 200 STRIP | Refills: 1 | Status: SHIPPED | OUTPATIENT
Start: 2018-10-10 | End: 2019-03-13

## 2018-10-10 NOTE — TELEPHONE ENCOUNTER
Requesting Truemetrix test strips  LOV: 8/20/18  RTC: 3 months  Last Relevant Labs: 8/2018  Filled: 8/6/18 #200 with 0 refills    Future Appointments   Date Time Provider Pratima Corral   11/27/2018 11:00 AM Wilma Lemus DO [facility] [address] [facility] [address]   7/

## 2018-10-18 RX ORDER — GLUCOSAM/CHON-MSM1/C/MANG/BOSW 500-416.6
TABLET ORAL
Qty: 200 EACH | Refills: 1 | Status: SHIPPED | OUTPATIENT
Start: 2018-10-18 | End: 2019-03-28

## 2018-10-18 NOTE — TELEPHONE ENCOUNTER
Requesting Trueplus lancets, Metformin  LOV: 8/20/18  RTC: 3 months  Last Relevant Labs: 8/20/18 = 8.8%  Filled: 8/13/18 #180 with 0 refills  Metformin  Filled: 8/13/18 #200 with 0 refills  Trueplus    Future Appointments   Date Time Provider Department Ce

## 2018-11-14 RX ORDER — OXYBUTYNIN CHLORIDE 5 MG/1
TABLET ORAL
Qty: 270 TABLET | Refills: 3 | Status: SHIPPED | OUTPATIENT
Start: 2018-11-14 | End: 2019-04-04

## 2018-11-14 NOTE — TELEPHONE ENCOUNTER
Requesting OXYBUTYNIN CHLORIDE 5 MG Oral Tab  LOV: 8/20/18  RTC: 3 mos  Last Labs: 6/26/18  Filled: 6/11/18 #270 with 1 refills    Future Appointments   Date Time Provider Pratima Corral   11/27/2018 11:00 AM Wilma Lemus DO [facility] [address] [facility] [address]   7

## 2018-11-20 ENCOUNTER — LAB ENCOUNTER (OUTPATIENT)
Dept: LAB | Age: 78
End: 2018-11-20
Attending: FAMILY MEDICINE
Payer: MEDICARE

## 2018-11-20 DIAGNOSIS — E78.2 MIXED HYPERLIPIDEMIA: ICD-10-CM

## 2018-11-20 DIAGNOSIS — N18.2 TYPE 2 DIABETES MELLITUS WITH STAGE 2 CHRONIC KIDNEY DISEASE, WITHOUT LONG-TERM CURRENT USE OF INSULIN (HCC): ICD-10-CM

## 2018-11-20 DIAGNOSIS — E11.22 TYPE 2 DIABETES MELLITUS WITH STAGE 2 CHRONIC KIDNEY DISEASE, WITHOUT LONG-TERM CURRENT USE OF INSULIN (HCC): ICD-10-CM

## 2018-11-20 PROCEDURE — 36415 COLL VENOUS BLD VENIPUNCTURE: CPT

## 2018-11-20 PROCEDURE — 85025 COMPLETE CBC W/AUTO DIFF WBC: CPT

## 2018-11-20 PROCEDURE — 80061 LIPID PANEL: CPT

## 2018-11-20 PROCEDURE — 83036 HEMOGLOBIN GLYCOSYLATED A1C: CPT

## 2018-11-20 PROCEDURE — 80053 COMPREHEN METABOLIC PANEL: CPT

## 2018-11-27 ENCOUNTER — OFFICE VISIT (OUTPATIENT)
Dept: FAMILY MEDICINE CLINIC | Facility: CLINIC | Age: 78
End: 2018-11-27
Payer: MEDICAID

## 2018-11-27 ENCOUNTER — APPOINTMENT (OUTPATIENT)
Dept: LAB | Age: 78
End: 2018-11-27
Attending: FAMILY MEDICINE
Payer: MEDICARE

## 2018-11-27 VITALS
TEMPERATURE: 97 F | BODY MASS INDEX: 25.58 KG/M2 | HEIGHT: 62 IN | DIASTOLIC BLOOD PRESSURE: 88 MMHG | WEIGHT: 139 LBS | HEART RATE: 72 BPM | RESPIRATION RATE: 16 BRPM | SYSTOLIC BLOOD PRESSURE: 120 MMHG

## 2018-11-27 DIAGNOSIS — N18.2 TYPE 2 DIABETES MELLITUS WITH STAGE 2 CHRONIC KIDNEY DISEASE, WITHOUT LONG-TERM CURRENT USE OF INSULIN (HCC): Primary | ICD-10-CM

## 2018-11-27 DIAGNOSIS — E11.22 TYPE 2 DIABETES MELLITUS WITH STAGE 2 CHRONIC KIDNEY DISEASE, WITHOUT LONG-TERM CURRENT USE OF INSULIN (HCC): Primary | ICD-10-CM

## 2018-11-27 DIAGNOSIS — E55.9 VITAMIN D DEFICIENCY: ICD-10-CM

## 2018-11-27 DIAGNOSIS — N18.2 TYPE 2 DIABETES MELLITUS WITH STAGE 2 CHRONIC KIDNEY DISEASE, WITHOUT LONG-TERM CURRENT USE OF INSULIN (HCC): ICD-10-CM

## 2018-11-27 DIAGNOSIS — E11.22 TYPE 2 DIABETES MELLITUS WITH STAGE 2 CHRONIC KIDNEY DISEASE, WITHOUT LONG-TERM CURRENT USE OF INSULIN (HCC): ICD-10-CM

## 2018-11-27 PROCEDURE — 82570 ASSAY OF URINE CREATININE: CPT

## 2018-11-27 PROCEDURE — 99213 OFFICE O/P EST LOW 20 MIN: CPT | Performed by: FAMILY MEDICINE

## 2018-11-27 PROCEDURE — 82043 UR ALBUMIN QUANTITATIVE: CPT

## 2018-11-27 NOTE — PROGRESS NOTES
HPI:   Mayra Elias is a 66year old female that presents for diabetes follow-up. Patient had labs done 11/20/18, reviewed with patient and son in detail. Patient already had flu vaccine at her residence, [facility].   A1c improved from 8.8-->7.9, glucose Disp: 90 tablet, Rfl: 2  •  Rivastigmine Tartrate 6 MG Oral Cap, Take 1 capsule (6 mg total) by mouth 2 (two) times daily. , Disp: 180 capsule, Rfl: 4  •  Memantine HCl 10 MG Oral Tab, Take 1 tablet (10 mg total) by mouth 2 (two) times daily. , Disp: 180 tab 11/27/2018  10:55 AM

## 2019-01-25 ENCOUNTER — TELEPHONE (OUTPATIENT)
Dept: FAMILY MEDICINE CLINIC | Facility: CLINIC | Age: 79
End: 2019-01-25

## 2019-01-25 DIAGNOSIS — E11.22 TYPE 2 DIABETES MELLITUS WITH STAGE 2 CHRONIC KIDNEY DISEASE, WITHOUT LONG-TERM CURRENT USE OF INSULIN (HCC): ICD-10-CM

## 2019-01-25 DIAGNOSIS — N18.2 TYPE 2 DIABETES MELLITUS WITH STAGE 2 CHRONIC KIDNEY DISEASE, WITHOUT LONG-TERM CURRENT USE OF INSULIN (HCC): ICD-10-CM

## 2019-01-28 RX ORDER — GLIPIZIDE 2.5 MG/1
TABLET, EXTENDED RELEASE ORAL
Qty: 90 TABLET | Refills: 0 | Status: SHIPPED | OUTPATIENT
Start: 2019-01-28 | End: 2019-04-01

## 2019-01-28 NOTE — TELEPHONE ENCOUNTER
Requested Medications      Name from pharmacy: GLIPIZIDE ER 2.5 MG Tablet Extended Release 24 Hour         Will file in chart as: GLIPIZIDE ER 2.5 MG Oral Tablet 24 Hr    Sig: TAKE 1 TABLET DAILY WITH BREAKFAST.     Disp:  90 tablet    Refills:  1    Start:

## 2019-02-11 ENCOUNTER — APPOINTMENT (OUTPATIENT)
Dept: LAB | Age: 79
End: 2019-02-11
Attending: FAMILY MEDICINE
Payer: MEDICARE

## 2019-02-11 ENCOUNTER — OFFICE VISIT (OUTPATIENT)
Dept: FAMILY MEDICINE CLINIC | Facility: CLINIC | Age: 79
End: 2019-02-11
Payer: COMMERCIAL

## 2019-02-11 VITALS
TEMPERATURE: 97 F | HEART RATE: 72 BPM | BODY MASS INDEX: 25.6 KG/M2 | WEIGHT: 139.13 LBS | RESPIRATION RATE: 16 BRPM | SYSTOLIC BLOOD PRESSURE: 104 MMHG | HEIGHT: 62 IN | DIASTOLIC BLOOD PRESSURE: 60 MMHG

## 2019-02-11 DIAGNOSIS — E11.22 TYPE 2 DIABETES MELLITUS WITH STAGE 2 CHRONIC KIDNEY DISEASE, WITHOUT LONG-TERM CURRENT USE OF INSULIN (HCC): ICD-10-CM

## 2019-02-11 DIAGNOSIS — R30.0 DYSURIA: Primary | ICD-10-CM

## 2019-02-11 DIAGNOSIS — N18.2 TYPE 2 DIABETES MELLITUS WITH STAGE 2 CHRONIC KIDNEY DISEASE, WITHOUT LONG-TERM CURRENT USE OF INSULIN (HCC): ICD-10-CM

## 2019-02-11 DIAGNOSIS — E55.9 VITAMIN D DEFICIENCY: ICD-10-CM

## 2019-02-11 LAB
ALBUMIN SERPL-MCNC: 4.2 G/DL (ref 3.1–4.5)
ALBUMIN/GLOB SERPL: 1.5 {RATIO} (ref 1–2)
ALP LIVER SERPL-CCNC: 78 U/L (ref 55–142)
ALT SERPL-CCNC: 27 U/L (ref 14–54)
ANION GAP SERPL CALC-SCNC: 10 MMOL/L (ref 0–18)
AST SERPL-CCNC: 23 U/L (ref 15–41)
BILIRUB SERPL-MCNC: 0.4 MG/DL (ref 0.1–2)
BUN BLD-MCNC: 9 MG/DL (ref 8–20)
BUN/CREAT SERPL: 16.1 (ref 10–20)
CALCIUM BLD-MCNC: 9.4 MG/DL (ref 8.3–10.3)
CHLORIDE SERPL-SCNC: 104 MMOL/L (ref 101–111)
CO2 SERPL-SCNC: 26 MMOL/L (ref 22–32)
CREAT BLD-MCNC: 0.56 MG/DL (ref 0.55–1.02)
EST. AVERAGE GLUCOSE BLD GHB EST-MCNC: 174 MG/DL (ref 68–126)
GLOBULIN PLAS-MCNC: 2.8 G/DL (ref 2.8–4.4)
GLUCOSE BLD-MCNC: 92 MG/DL (ref 70–99)
HBA1C MFR BLD HPLC: 7.7 % (ref ?–5.7)
M PROTEIN MFR SERPL ELPH: 7 G/DL (ref 6.4–8.2)
MULTISTIX LOT#: NORMAL NUMERIC
OSMOLALITY SERPL CALC.SUM OF ELEC: 288 MOSM/KG (ref 275–295)
PH, URINE: 6 (ref 4.5–8)
POTASSIUM SERPL-SCNC: 4.1 MMOL/L (ref 3.6–5.1)
PROTEIN (URINE DIPSTICK): 30 MG/DL
SODIUM SERPL-SCNC: 140 MMOL/L (ref 136–144)
SPECIFIC GRAVITY: 1.02 (ref 1–1.03)
URINE-COLOR: YELLOW
UROBILINOGEN,SEMI-QN: 0.2 MG/DL (ref 0–1.9)
VIT D+METAB SERPL-MCNC: 25.6 NG/ML (ref 30–100)

## 2019-02-11 PROCEDURE — 83036 HEMOGLOBIN GLYCOSYLATED A1C: CPT

## 2019-02-11 PROCEDURE — 87077 CULTURE AEROBIC IDENTIFY: CPT | Performed by: FAMILY MEDICINE

## 2019-02-11 PROCEDURE — 99214 OFFICE O/P EST MOD 30 MIN: CPT | Performed by: FAMILY MEDICINE

## 2019-02-11 PROCEDURE — 87086 URINE CULTURE/COLONY COUNT: CPT | Performed by: FAMILY MEDICINE

## 2019-02-11 PROCEDURE — 82306 VITAMIN D 25 HYDROXY: CPT

## 2019-02-11 PROCEDURE — 87186 SC STD MICRODIL/AGAR DIL: CPT | Performed by: FAMILY MEDICINE

## 2019-02-11 PROCEDURE — 36415 COLL VENOUS BLD VENIPUNCTURE: CPT

## 2019-02-11 PROCEDURE — 80053 COMPREHEN METABOLIC PANEL: CPT

## 2019-02-11 PROCEDURE — 81003 URINALYSIS AUTO W/O SCOPE: CPT | Performed by: FAMILY MEDICINE

## 2019-02-11 NOTE — PATIENT INSTRUCTIONS
Dysuria with Uncertain Cause (Adult)    The urethra is the tube that allows urine to pass out of the body. In a woman, the urethra is the opening above the vagina. In men, the urethra is the opening on the tip of the penis.  Dysuria is the feeling of pain · Contact your healthcare provider or go to an urgent care clinic or the public health department to be looked at and treated.   · Don't have sex until both you and your partner(s) have finished all antibiotics and your healthcare provider says you are no l Corns and calluses are your body’s response to friction or pressure against the skin. If your foot rubs the inside of your shoe, the affected area of skin thickens.  Or, if a bone is not in the normal position, skin caught between bone and shoe or bone and If your corns or calluses are mild, reducing friction may help. Different shoes, moleskin patches, or soft pads may be all the treatment you need. In more severe cases, treating tissue buildup may require your doctor’s care.  Sometimes custom-made shoe inse

## 2019-02-11 NOTE — PROGRESS NOTES
HPI:   Mayra Elias is a 66year old female that presents for dysuria and Dm2 follow up. External vaginal stinging when she pees since 2/7. No urgency or frequency more than usual, she does have OAB at baseline.   Denies hematuria, abdominal pain, flank 1 TABLET EVERY NIGHT, Disp: 90 tablet, Rfl: 2  •  Rivastigmine Tartrate 6 MG Oral Cap, Take 1 capsule (6 mg total) by mouth 2 (two) times daily. , Disp: 180 capsule, Rfl: 4  •  Memantine HCl 10 MG Oral Tab, Take 1 tablet (10 mg total) by mouth 2 (two) times URINALYSIS, AUTO, W/O SCOPE  - URINE CULTURE, ROUTINE; Future  - URINE CULTURE, ROUTINE  - Ucx +, will start macrobid and f/u if not improving     2.  Type 2 diabetes mellitus with stage 2 chronic kidney disease, without long-term current use of insulin (HC

## 2019-02-12 ENCOUNTER — TELEPHONE (OUTPATIENT)
Dept: FAMILY MEDICINE CLINIC | Facility: CLINIC | Age: 79
End: 2019-02-12

## 2019-02-12 NOTE — TELEPHONE ENCOUNTER
Still burns when she goes to the bathroom. She doesn't know what to do. Pt was here yesterday --- she thinks her appt was 2 days ago.

## 2019-02-12 NOTE — TELEPHONE ENCOUNTER
PT reports burning and she states the burning is unbearable. She is experiencing frequency every 30-40 mins. States her labia is red. No discharge.  Pt is requesting an ointment to help with the burning sensation     Urine culture still in process    LOV: 2

## 2019-02-13 RX ORDER — NITROFURANTOIN 25; 75 MG/1; MG/1
100 CAPSULE ORAL 2 TIMES DAILY
Qty: 14 CAPSULE | Refills: 0 | Status: SHIPPED | OUTPATIENT
Start: 2019-02-13 | End: 2019-02-20

## 2019-02-28 ENCOUNTER — TELEPHONE (OUTPATIENT)
Dept: FAMILY MEDICINE CLINIC | Facility: CLINIC | Age: 79
End: 2019-02-28

## 2019-03-13 RX ORDER — CALCIUM CITRATE/VITAMIN D3 200MG-6.25
TABLET ORAL
Qty: 200 STRIP | Refills: 1 | Status: SHIPPED | OUTPATIENT
Start: 2019-03-13 | End: 2019-08-14

## 2019-03-13 NOTE — TELEPHONE ENCOUNTER
Requested Medications      Name from pharmacy: SIGRID Randall         Will file in chart as: TRUE METRIX BLOOD GLUCOSE TEST In Vitro Strip    Sig: USE  FOR  TESTING BLOOD SUGAR TWICE DAILY    Disp:  200 strip    Re

## 2019-03-28 RX ORDER — GLUCOSAM/CHON-MSM1/C/MANG/BOSW 500-416.6
TABLET ORAL
Qty: 200 EACH | Refills: 1 | Status: SHIPPED | OUTPATIENT
Start: 2019-03-28 | End: 2020-09-30 | Stop reason: CLARIF

## 2019-03-28 NOTE — TELEPHONE ENCOUNTER
Diabetic Supplies Protocol Passed3/28 9:10 AM   Appointment in the past 12 or next 3 months     Requesting trueplus lancets  LOV: 2/11/19  RTC: 2 months  Last Relevant Labs: 2/11/19  Filled: 10/18/18  # 200 with 1 refills    Future Appointments   Date Time

## 2019-04-01 DIAGNOSIS — E11.22 TYPE 2 DIABETES MELLITUS WITH STAGE 2 CHRONIC KIDNEY DISEASE, WITHOUT LONG-TERM CURRENT USE OF INSULIN (HCC): ICD-10-CM

## 2019-04-01 DIAGNOSIS — N18.2 TYPE 2 DIABETES MELLITUS WITH STAGE 2 CHRONIC KIDNEY DISEASE, WITHOUT LONG-TERM CURRENT USE OF INSULIN (HCC): ICD-10-CM

## 2019-04-02 RX ORDER — GLIPIZIDE 2.5 MG/1
TABLET, EXTENDED RELEASE ORAL
Qty: 90 TABLET | Refills: 0 | Status: SHIPPED | OUTPATIENT
Start: 2019-04-02 | End: 2019-04-20

## 2019-04-02 NOTE — TELEPHONE ENCOUNTER
Requested Medications      Name from pharmacy: GLIPIZIDE ER 2.5 MG Tablet Extended Release 24 Hour         Will file in chart as: GLIPIZIDE ER 2.5 MG Oral Tablet 24 Hr    Sig: TAKE 1 TABLET DAILY WITH BREAKFAST.     Disp:  90 tablet    Refills:  0    Start:

## 2019-04-04 ENCOUNTER — LAB ENCOUNTER (OUTPATIENT)
Dept: LAB | Age: 79
End: 2019-04-04
Attending: FAMILY MEDICINE
Payer: MEDICARE

## 2019-04-04 ENCOUNTER — TELEPHONE (OUTPATIENT)
Dept: FAMILY MEDICINE CLINIC | Facility: CLINIC | Age: 79
End: 2019-04-04

## 2019-04-04 ENCOUNTER — OFFICE VISIT (OUTPATIENT)
Dept: FAMILY MEDICINE CLINIC | Facility: CLINIC | Age: 79
End: 2019-04-04
Payer: COMMERCIAL

## 2019-04-04 VITALS
HEIGHT: 62 IN | WEIGHT: 142 LBS | TEMPERATURE: 98 F | RESPIRATION RATE: 16 BRPM | HEART RATE: 72 BPM | DIASTOLIC BLOOD PRESSURE: 70 MMHG | BODY MASS INDEX: 26.13 KG/M2 | SYSTOLIC BLOOD PRESSURE: 122 MMHG

## 2019-04-04 DIAGNOSIS — N32.81 OAB (OVERACTIVE BLADDER): ICD-10-CM

## 2019-04-04 DIAGNOSIS — G30.1 LATE ONSET ALZHEIMER'S DISEASE WITHOUT BEHAVIORAL DISTURBANCE (HCC): ICD-10-CM

## 2019-04-04 DIAGNOSIS — M85.89 OSTEOPENIA OF MULTIPLE SITES: ICD-10-CM

## 2019-04-04 DIAGNOSIS — N18.2 TYPE 2 DIABETES MELLITUS WITH STAGE 2 CHRONIC KIDNEY DISEASE, WITHOUT LONG-TERM CURRENT USE OF INSULIN (HCC): ICD-10-CM

## 2019-04-04 DIAGNOSIS — Z00.00 ENCOUNTER FOR ANNUAL HEALTH EXAMINATION: ICD-10-CM

## 2019-04-04 DIAGNOSIS — F02.80 LATE ONSET ALZHEIMER'S DISEASE WITHOUT BEHAVIORAL DISTURBANCE (HCC): ICD-10-CM

## 2019-04-04 DIAGNOSIS — E55.9 VITAMIN D DEFICIENCY: ICD-10-CM

## 2019-04-04 DIAGNOSIS — E11.22 TYPE 2 DIABETES MELLITUS WITH STAGE 2 CHRONIC KIDNEY DISEASE, WITHOUT LONG-TERM CURRENT USE OF INSULIN (HCC): ICD-10-CM

## 2019-04-04 DIAGNOSIS — Z13.31 NEGATIVE DEPRESSION SCREENING: ICD-10-CM

## 2019-04-04 DIAGNOSIS — K75.81 NASH (NONALCOHOLIC STEATOHEPATITIS): ICD-10-CM

## 2019-04-04 DIAGNOSIS — E78.2 MIXED HYPERLIPIDEMIA: ICD-10-CM

## 2019-04-04 DIAGNOSIS — N39.41 URGE INCONTINENCE OF URINE: ICD-10-CM

## 2019-04-04 DIAGNOSIS — Z00.00 ENCOUNTER FOR ANNUAL HEALTH EXAMINATION: Primary | ICD-10-CM

## 2019-04-04 DIAGNOSIS — R10.13 EPIGASTRIC PAIN: ICD-10-CM

## 2019-04-04 DIAGNOSIS — Z12.39 BREAST CANCER SCREENING: ICD-10-CM

## 2019-04-04 PROCEDURE — 99397 PER PM REEVAL EST PAT 65+ YR: CPT | Performed by: FAMILY MEDICINE

## 2019-04-04 PROCEDURE — 36415 COLL VENOUS BLD VENIPUNCTURE: CPT

## 2019-04-04 PROCEDURE — 85025 COMPLETE CBC W/AUTO DIFF WBC: CPT

## 2019-04-04 PROCEDURE — G0439 PPPS, SUBSEQ VISIT: HCPCS | Performed by: FAMILY MEDICINE

## 2019-04-04 PROCEDURE — 80061 LIPID PANEL: CPT

## 2019-04-04 PROCEDURE — 80053 COMPREHEN METABOLIC PANEL: CPT

## 2019-04-04 PROCEDURE — 84443 ASSAY THYROID STIM HORMONE: CPT

## 2019-04-04 PROCEDURE — 96160 PT-FOCUSED HLTH RISK ASSMT: CPT | Performed by: FAMILY MEDICINE

## 2019-04-04 RX ORDER — OMEPRAZOLE 20 MG/1
20 CAPSULE, DELAYED RELEASE ORAL
Qty: 30 CAPSULE | Refills: 1 | Status: SHIPPED | OUTPATIENT
Start: 2019-04-04 | End: 2020-09-17

## 2019-04-04 RX ORDER — OXYBUTYNIN CHLORIDE 5 MG/1
5 TABLET ORAL 4 TIMES DAILY
Qty: 360 TABLET | Refills: 3 | Status: SHIPPED | OUTPATIENT
Start: 2019-04-04 | End: 2020-03-25

## 2019-04-04 NOTE — PATIENT INSTRUCTIONS
Stomach Pain  Omeprazole 20 mg daily before breakfast.    Take for 1-2 months and follow up if not improving  Also available over the counter if not covered by insurance  Avoid high acid foods (ex: tomato, orange juice, coffee, chocolate, spicy food, etc) 05/01/2015 139     Triglycerides (mg/dL)   Date Value   11/20/2018 66        EKG - covered if needed at Welcome to Medicare, and non-screening if indicated for medical reasons Electrocardiogram date09/06/2017 Routine EKG is not a screening covered servic Visit Summary   Pap and Pelvic      Pap: Every 3 yrs age 21-68 or Pap+HPV every 5 yrs age 33-67, Covered every 2 yrs up to age 79 or Yearly if High Risk   There are no preventive care reminders to display for this patient.      Chlamydia  Annually if high r for this or any previous visit. This may be covered with your pharmacy  prescription benefits     Recommended Websites for Advanced Directives    [email or web address] org/publications/[email or web address] pdf  An information packet, including necessary form f

## 2019-04-04 NOTE — TELEPHONE ENCOUNTER
Pharmacy states they do not have Omeprazole in disinigrating tabs. They have capsules, can it be changed? Please advise.

## 2019-04-04 NOTE — PROGRESS NOTES
HPI:   Cali Simmons is a 66year old female who presents for a MA (Medicare Advantage) [facility] (Once per calendar year).     Her last annual assessment has been over 1 year: Annual Physical due on 03/05/2019      Patient Active Problem List    Late onse Correct  Recall \"Ball\": Correct  Recall \"Flag\": Incorrect  Recall \"Tree\":  Incorrect    Her results are either absent, abnormal, or older than 9days old  If update needed, Please go to \"Cognitive Assessment\" under Medicare Assessment section in 0 [facility] diabetes mellitus with stage 2 chronic kidney disease, without long-term current use of insulin (HCC)     Urge incontinence of urine     Osteopenia of multiple sites     PORTILLO (nonalcoholic steatohepatitis)     Late onset Alzheimer's disease without behavio daily.   Cholecalciferol (VITAMIN D) 1000 UNITS Oral Tab Take 1,000 Units by mouth daily.       MEDICAL INFORMATION:   She  has a past medical history of Abnormal mammogram of left breast (5/4/2017), Breast mass, right (5/4/2017), Breast mass, right (5/4/20 history  ALL/ASTHMA: denies hx of allergy or asthma    EXAM:   /70   Pulse 72   Temp 97.8 °F (36.6 °C) (Temporal)   Resp 16   Ht 62\"   Wt 142 lb   BMI 25.97 kg/m²  Estimated body mass index is 25.97 kg/m² as calculated from the following:    Height MULTIDOSE VIAL (55302) FLU CLINIC 12/15/2014   • FLUAD High Dose 65 yr and older (42818) 12/04/2017   • FLUZONE 3 Yrs+ Quad Prsv Free 0.5 ml (47284) 02/13/2017   • Influenza 10/16/2013, 09/27/2018   • Pneumococcal (Prevnar 13) 10/06/2016   • Pneumovax 23 1 DEXA 2018, on ca and D and exercises, repeat dexa [id number]    Epigastric pain  -     Omeprazole 20 MG Oral Tablet Delayed Release Dispersible; Take 20 mg by mouth daily.   - likely GERD or gastritis, trial of PPI and dietary modifications discussed, f.u if n Fecal Occult Blood Annually No results found for: FOB No flowsheet data found.     Glaucoma Screening      Ophthalmology Visit Annually: Diabetics, FHx Glaucoma, AA>50, > 65 Data entered on: 6/26/2018   Last Dilated Eye Exam 6/26/2018       Bone Den MONITORING Internal Lab or Procedure External Lab or Procedure         Diabetes      HgbA1C  Annually HEMOGLOBIN A1C (%)   Date Value   08/20/2018 8.8 (A)     HgbA1C (%)   Date Value   02/11/2019 7.7 (H)       No flowsheet data found.     Creat/alb ratio  A

## 2019-04-05 ENCOUNTER — TELEPHONE (OUTPATIENT)
Dept: FAMILY MEDICINE CLINIC | Facility: CLINIC | Age: 79
End: 2019-04-05

## 2019-04-08 NOTE — PROGRESS NOTES
Received a DM eye exam dated 6/26/18 from Dr. Jo-Ann Ambrose. Exam shows No diabetic retinopathy. Report has been signed by MD and abstracted. Report sent to scan.

## 2019-04-20 PROBLEM — R92.8 ABNORMAL MAMMOGRAM OF LEFT BREAST: Status: RESOLVED | Noted: 2017-05-04 | Resolved: 2019-04-20

## 2019-04-20 PROBLEM — Z98.890 S/P LEFT BREAST BIOPSY: Status: RESOLVED | Noted: 2017-05-17 | Resolved: 2019-04-20

## 2019-04-20 RX ORDER — ATORVASTATIN CALCIUM 20 MG/1
TABLET, FILM COATED ORAL
Qty: 90 TABLET | Refills: 3 | Status: SHIPPED | OUTPATIENT
Start: 2019-04-20 | End: 2020-05-12

## 2019-04-20 RX ORDER — GLIPIZIDE 2.5 MG/1
TABLET, EXTENDED RELEASE ORAL
Qty: 90 TABLET | Refills: 3 | Status: SHIPPED | OUTPATIENT
Start: 2019-04-20 | End: 2020-05-12

## 2019-06-26 ENCOUNTER — HOSPITAL ENCOUNTER (OUTPATIENT)
Dept: MAMMOGRAPHY | Age: 79
Discharge: HOME | End: 2019-06-26
Attending: FAMILY MEDICINE
Payer: MEDICARE

## 2019-06-26 DIAGNOSIS — Z12.39 BREAST CANCER SCREENING: ICD-10-CM

## 2019-06-26 PROCEDURE — 77063 BREAST TOMOSYNTHESIS BI: CPT | Performed by: FAMILY MEDICINE

## 2019-06-26 PROCEDURE — 77067 SCR MAMMO BI INCL CAD: CPT | Performed by: FAMILY MEDICINE

## 2019-07-01 ENCOUNTER — MED REC SCAN ONLY (OUTPATIENT)
Dept: FAMILY MEDICINE CLINIC | Facility: CLINIC | Age: 79
End: 2019-07-01

## 2019-07-11 ENCOUNTER — TELEPHONE (OUTPATIENT)
Dept: FAMILY MEDICINE CLINIC | Facility: CLINIC | Age: 79
End: 2019-07-11

## 2019-07-11 NOTE — TELEPHONE ENCOUNTER
RN is requesting an updated med list. Provided information below.       Fax [phone] Attn Argentina Arteaga  Medication Quantity Refills Start End   Oxybutynin Chloride 5 MG Oral Tab 360 tablet 3 4/4/2019    Sig:   Take 1 tablet (5 mg total) by mouth 4 (four) times

## 2019-07-11 NOTE — TELEPHONE ENCOUNTER
Nurse is questioning the dose of a RX Oxybutynin, she states the patient is telling her something different than the chart, please advise.

## 2019-07-18 NOTE — PROGRESS NOTES
Received DM eye exam dated 7/17/19 from Dr. Peri Bosworth. Exam shows NO diabetic retinopathy. Exam has been abstracted.

## 2019-08-14 RX ORDER — CALCIUM CITRATE/VITAMIN D3 200MG-6.25
TABLET ORAL
Qty: 200 STRIP | Refills: 0 | Status: SHIPPED | OUTPATIENT
Start: 2019-08-14 | End: 2019-10-16

## 2019-08-14 NOTE — TELEPHONE ENCOUNTER
Requested Medications      Name from pharmacy: [facility]         Will file in chart as: TRUE METRIX BLOOD GLUCOSE TEST In Vitro Strip    Sig: TEST BLOOD SUGAR TWICE DAILY    Disp:  200 strip    Refills:  1

## 2019-09-10 ENCOUNTER — OFFICE VISIT (OUTPATIENT)
Dept: FAMILY MEDICINE CLINIC | Facility: CLINIC | Age: 79
End: 2019-09-10
Payer: COMMERCIAL

## 2019-09-10 VITALS
SYSTOLIC BLOOD PRESSURE: 116 MMHG | HEART RATE: 75 BPM | TEMPERATURE: 98 F | DIASTOLIC BLOOD PRESSURE: 62 MMHG | RESPIRATION RATE: 16 BRPM | BODY MASS INDEX: 26.96 KG/M2 | WEIGHT: 146.5 LBS | HEIGHT: 62 IN | OXYGEN SATURATION: 98 %

## 2019-09-10 DIAGNOSIS — J40 BRONCHITIS: Primary | ICD-10-CM

## 2019-09-10 DIAGNOSIS — Z23 NEED FOR VACCINATION: ICD-10-CM

## 2019-09-10 PROCEDURE — 99213 OFFICE O/P EST LOW 20 MIN: CPT | Performed by: FAMILY MEDICINE

## 2019-09-10 PROCEDURE — 90662 IIV NO PRSV INCREASED AG IM: CPT | Performed by: FAMILY MEDICINE

## 2019-09-10 PROCEDURE — 90471 IMMUNIZATION ADMIN: CPT | Performed by: FAMILY MEDICINE

## 2019-09-10 RX ORDER — BUDESONIDE AND FORMOTEROL FUMARATE DIHYDRATE 160; 4.5 UG/1; UG/1
2 AEROSOL RESPIRATORY (INHALATION) 2 TIMES DAILY
Qty: 1 INHALER | Refills: 0 | COMMUNITY
Start: 2019-09-10 | End: 2020-09-17

## 2019-09-10 NOTE — PROGRESS NOTES
HPI:   Eboni Phan is a 78year old female that presents for chest congestion, coughing, and fatigue. Started about x 2-3 weeks ago. Denies any fever, face pressure, runny nose, wheezing, ear pain, n/v, myalgia.  Started OTC mucinex and sugar free cough angel LANCETS 28G Does not apply Misc, TEST TWO TIMES DAILY, Disp: 200 each, Rfl: 1  •  METFORMIN HCL 1000 MG Oral Tab, TAKE 1 TABLET TWICE DAILY WITH MEALS, Disp: 180 tablet, Rfl: 3  •  Calcium Carbonate-Vit D-Min (CALCIUM 1200 OR), Take 1 tablet by mouth daily lingering, will treat with inhaler for 2 weeks then stop, continue supportive care and f/u PRN  - can consider zpak then cxr if not improving    2.  Need for vaccination  - FLU VACC HIGH DOSE PRSV FREE      Risks, benefits, and alternatives of current treat

## 2019-09-10 NOTE — PATIENT INSTRUCTIONS
Symbicort inhaler 2 puffs twice a day for 2 weeks then stop    Call in 3-4 days if not improving and can send antibiotic to pharmacy for you      Cough: mucinex DM twice a day (guifeniasin and dextromethorphan) . Take with a full glass of water.     Nasal · Your appetite may be poor, so a light diet is fine. Avoid dehydration by drinking 6 to 8 glasses of fluids per day (water, soft drinks, juices, tea, or soup). Extra fluids will help loosen secretions in the nose and lungs.   · Over-the-counter cold medici

## 2019-09-24 ENCOUNTER — TELEPHONE (OUTPATIENT)
Dept: FAMILY MEDICINE CLINIC | Facility: CLINIC | Age: 79
End: 2019-09-24

## 2019-09-24 RX ORDER — AZITHROMYCIN 250 MG/1
TABLET, FILM COATED ORAL
Qty: 6 TABLET | Refills: 0 | Status: SHIPPED | OUTPATIENT
Start: 2019-09-24 | End: 2019-10-11 | Stop reason: ALTCHOICE

## 2019-09-24 NOTE — TELEPHONE ENCOUNTER
Patient's son states she was seen a few weeks ago, is still not completely better, still has some chest congestion and cough which is not clearing up. Please advise.

## 2019-09-24 NOTE — TELEPHONE ENCOUNTER
9/10/19 OV Bronchitis Dr. Bhaskar Blas partial notes:  1. Bronchitis  - Budesonide-Formoterol Fumarate (SYMBICORT) 160-4.5 MCG/ACT Inhalation Aerosol; Inhale 2 puffs into the lungs 2 (two) times daily. Dispense: 1 Inhaler;  Refill: 0  - overall improving but cou

## 2019-09-24 NOTE — TELEPHONE ENCOUNTER
Son returned call, he states pt has improved, has lingering mild chest congestion. Pt lives at [facility].  He is not sure how well pt is using the Flonase Nasal Spray for effectiveness in dose administration or how well pt is remembering to take the Mucin

## 2019-09-26 NOTE — TELEPHONE ENCOUNTER
Requested Medications      Name from pharmacy: [facility]         Will file in chart as: METFORMIN HCL 1000 MG Oral Tab    Sig: TAKE 1 TABLET TWICE DAILY WITH MEALS    Disp:  180 tablet    Refills:  3    Start: 9/25/2019    Saul

## 2019-10-11 ENCOUNTER — LAB ENCOUNTER (OUTPATIENT)
Dept: LAB | Age: 79
End: 2019-10-11
Attending: FAMILY MEDICINE
Payer: MEDICARE

## 2019-10-11 ENCOUNTER — OFFICE VISIT (OUTPATIENT)
Dept: FAMILY MEDICINE CLINIC | Facility: CLINIC | Age: 79
End: 2019-10-11
Payer: COMMERCIAL

## 2019-10-11 VITALS
OXYGEN SATURATION: 98 % | TEMPERATURE: 97 F | DIASTOLIC BLOOD PRESSURE: 60 MMHG | SYSTOLIC BLOOD PRESSURE: 110 MMHG | RESPIRATION RATE: 16 BRPM | BODY MASS INDEX: 26.36 KG/M2 | HEART RATE: 69 BPM | WEIGHT: 143.25 LBS | HEIGHT: 62 IN

## 2019-10-11 DIAGNOSIS — Z00.00 LABORATORY EXAM ORDERED AS PART OF ROUTINE GENERAL MEDICAL EXAMINATION: ICD-10-CM

## 2019-10-11 DIAGNOSIS — E11.22 TYPE 2 DIABETES MELLITUS WITH STAGE 2 CHRONIC KIDNEY DISEASE, WITHOUT LONG-TERM CURRENT USE OF INSULIN (HCC): ICD-10-CM

## 2019-10-11 DIAGNOSIS — E78.2 MIXED HYPERLIPIDEMIA: ICD-10-CM

## 2019-10-11 DIAGNOSIS — Z74.09 IMPAIRED FUNCTIONAL MOBILITY, BALANCE, GAIT, AND ENDURANCE: ICD-10-CM

## 2019-10-11 DIAGNOSIS — Z00.00 ENCOUNTER FOR ANNUAL HEALTH EXAMINATION: ICD-10-CM

## 2019-10-11 DIAGNOSIS — M25.511 ACUTE PAIN OF RIGHT SHOULDER: Primary | ICD-10-CM

## 2019-10-11 DIAGNOSIS — N18.2 TYPE 2 DIABETES MELLITUS WITH STAGE 2 CHRONIC KIDNEY DISEASE, WITHOUT LONG-TERM CURRENT USE OF INSULIN (HCC): ICD-10-CM

## 2019-10-11 DIAGNOSIS — N32.81 OAB (OVERACTIVE BLADDER): ICD-10-CM

## 2019-10-11 PROCEDURE — 80061 LIPID PANEL: CPT

## 2019-10-11 PROCEDURE — 82570 ASSAY OF URINE CREATININE: CPT

## 2019-10-11 PROCEDURE — 85025 COMPLETE CBC W/AUTO DIFF WBC: CPT

## 2019-10-11 PROCEDURE — 80053 COMPREHEN METABOLIC PANEL: CPT

## 2019-10-11 PROCEDURE — 87086 URINE CULTURE/COLONY COUNT: CPT

## 2019-10-11 PROCEDURE — 87077 CULTURE AEROBIC IDENTIFY: CPT

## 2019-10-11 PROCEDURE — 36415 COLL VENOUS BLD VENIPUNCTURE: CPT

## 2019-10-11 PROCEDURE — 81001 URINALYSIS AUTO W/SCOPE: CPT

## 2019-10-11 PROCEDURE — 99214 OFFICE O/P EST MOD 30 MIN: CPT | Performed by: FAMILY MEDICINE

## 2019-10-11 PROCEDURE — 82043 UR ALBUMIN QUANTITATIVE: CPT

## 2019-10-11 PROCEDURE — 83036 HEMOGLOBIN GLYCOSYLATED A1C: CPT

## 2019-10-11 NOTE — PATIENT INSTRUCTIONS
Pain  Alternate Tylenol and ibuprofen as needed for pain and inflammation  Biofreeze and heating pad as needed  Physical Therapy  Follow-up if not improving    General Neck and Back Pain    Both neck and back pain are usually caused by injury to the muscle · Sudden twisting, bending or stretching from an accident (car or fall), or accidental movement.   · Poor posture  · Poor conditioning, lack of regular exercise  · Spinal disc disease or arthritis  · Stress  · Pregnancy, or illness like appendicitis, bladde · You may use over-the-counter medicine to control pain, unless another pain medicine was prescribed. If you have chronic conditions like diabetes, liver or kidney disease, stomach ulcers,  gastrointestinal bleeding, or are taking blood thinner medicines.

## 2019-10-11 NOTE — PROGRESS NOTES
HPI:   Amparo Wolfe is a 78year old female that presents for a fall, on Saturday 10/5. Per pt she tripped over cart on the floor. Did not hit her head or lose consciousness.   Some R shoulder and trapezius pain since, achy dull sore pain without radiation omeprazole 20 MG Oral Capsule Delayed Release, Take 1 capsule (20 mg total) by mouth every morning before breakfast., Disp: 30 capsule, Rfl: 1  •  TRUEPLUS LANCETS 28G Does not apply Misc, TEST TWO TIMES DAILY, Disp: 200 each, Rfl: 1  •  Calcium Carbonate- nondistended, nontender, bowel sounds normal in all 4 quadrants, no masses, no hepatosplenomegaly. EXTREMITIES:  No edema, no cyanosis, 2+ radial pulses b/l. NEURO:  A&Ox1, Gait stable, coordination intact.   Normal strength, sensation and reflexes throu

## 2019-10-17 RX ORDER — CALCIUM CITRATE/VITAMIN D3 200MG-6.25
TABLET ORAL
Qty: 200 STRIP | Refills: 0 | Status: SHIPPED | OUTPATIENT
Start: 2019-10-17 | End: 2019-12-18

## 2019-11-08 ENCOUNTER — HOSPITAL ENCOUNTER (EMERGENCY)
Facility: HOSPITAL | Age: 79
Discharge: HOME | End: 2019-11-08
Attending: EMERGENCY MEDICINE
Payer: MEDICARE

## 2019-11-08 ENCOUNTER — TELEPHONE (OUTPATIENT)
Dept: FAMILY MEDICINE CLINIC | Facility: CLINIC | Age: 79
End: 2019-11-08

## 2019-11-08 VITALS
WEIGHT: 143.31 LBS | HEART RATE: 67 BPM | SYSTOLIC BLOOD PRESSURE: 139 MMHG | TEMPERATURE: 98 F | OXYGEN SATURATION: 96 % | RESPIRATION RATE: 16 BRPM | BODY MASS INDEX: 26 KG/M2 | DIASTOLIC BLOOD PRESSURE: 69 MMHG

## 2019-11-08 DIAGNOSIS — S01.511A LIP LACERATION, INITIAL ENCOUNTER: Primary | ICD-10-CM

## 2019-11-08 PROCEDURE — 99283 EMERGENCY DEPT VISIT LOW MDM: CPT

## 2019-11-08 PROCEDURE — 12011 RPR F/E/E/N/L/M 2.5 CM/<: CPT

## 2019-11-08 RX ORDER — CLINDAMYCIN HYDROCHLORIDE 300 MG/1
300 CAPSULE ORAL 2 TIMES DAILY
Qty: 10 CAPSULE | Refills: 0 | Status: SHIPPED | OUTPATIENT
Start: 2019-11-08 | End: 2019-11-12

## 2019-11-08 NOTE — TELEPHONE ENCOUNTER
Dr.Broton- Gibran Le with [facility] is calling to let  know patient had a fall. Pt told nurse she fell on knees as she was going to the bank and fell face forward. Pt has a swollen lip and has a deep cut inside her lip.     Patient's family was noti

## 2019-11-08 NOTE — ED INITIAL ASSESSMENT (HPI)
Pt arrives s/p fall this morning, pt states she tripped and fell on her face, denies hitting head, no thinners. Pt has lip lac, teeth intact.

## 2019-11-09 NOTE — ED PROVIDER NOTES
Patient Seen in: [facility] Emergency Department      History   Patient presents with:  Fall (musculoskeletal, neurologic)    Stated Complaint: fall lip    HPI    Patient is a 43-year-old female.   She arrives to the emergency department for evaluati benign.    • KRYSTAL BIOPSY STEREO NODULE 2 SITE BILAT (CPT=19081/92130)  05/2017    benign                    Social History    Tobacco Use      Smoking status: Former Smoker        Packs/day: 1.00        Types: Cigarettes      Smokeless tobacco: Never Used the room with no difficulty. Full active range of motion of all 4 extremities. No complaint of headache, dizziness. No neck pain. Mechanical fall. The wound was first topically anesthetized using lidocaine.   I then further anesthetized the region

## 2019-11-12 ENCOUNTER — TELEPHONE (OUTPATIENT)
Dept: FAMILY MEDICINE CLINIC | Facility: CLINIC | Age: 79
End: 2019-11-12

## 2019-11-12 RX ORDER — CLINDAMYCIN HYDROCHLORIDE 300 MG/1
300 CAPSULE ORAL 2 TIMES DAILY
Qty: 10 CAPSULE | Refills: 0 | Status: SHIPPED | OUTPATIENT
Start: 2019-11-12 | End: 2019-11-17

## 2019-11-12 NOTE — TELEPHONE ENCOUNTER
Clindamycin sent to pharmacy, would also use ice packs and tylenol for pain/swelling. Follow up if not improving. Would watch closely for diarrhea with abx, clinda is good for oral infections, but higher risk of Cdiff.      Sarita Aguilar, DO  Family Medi

## 2019-11-12 NOTE — TELEPHONE ENCOUNTER
Received call back from pt's son, states he is with pt now and lower lip is red and appears to be more swollen than when he saw pt on 11/8/19. Pt denies pain, no fever. Pt's son is requesting antibiotic be sent to pharmacy. Please advise.

## 2019-11-12 NOTE — TELEPHONE ENCOUNTER
- Can rx be resent to local pharmacy? This was sent to the mail order and is needed today if possible. Clindamycin HCl 300 MG Oral Cap 10 capsule 0 11/8/2019 11/13/2019    Sig - Route:  Take 1 capsule (300 mg total) by mouth 2 (two) times daily

## 2019-11-12 NOTE — TELEPHONE ENCOUNTER
Spoke with pt's son per HIPAA consent, states that pt was in the ER on 11/8/19 s/p fall and lip laceration. ER sent Clindamycin to mail order pharmacy, pt's son wondering if we can resend Rx to local pharmacy.  Pt's son states he has not need pt since ER vi

## 2019-11-12 NOTE — TELEPHONE ENCOUNTER
Spoke with pt's son, informed of MD recommendations below. Advised to start Clindamycin twice daily, use ice packs and tylenol for pain/swelling. Advised to watch pt for diarrhea. If no improvement with antibiotic to make follow up appt with Dr. Jess Good.  If

## 2019-12-02 NOTE — TELEPHONE ENCOUNTER
Requested Prescriptions      Name from pharmacy: [address] [place] [address],[id number]         Will file in chart as: METFORMIN HCL 1000 MG Oral Tab         Sig: TAKE 1 TABLET TWICE DAILY WITH MEALS    Disp:  180 tablet    Refills:  0    Start: 12/2/2019

## 2019-12-18 RX ORDER — CALCIUM CITRATE/VITAMIN D3 200MG-6.25
TABLET ORAL
Qty: 200 STRIP | Refills: 0 | Status: SHIPPED | OUTPATIENT
Start: 2019-12-18 | End: 2020-02-24

## 2019-12-18 NOTE — TELEPHONE ENCOUNTER
Requested Prescriptions      Name from pharmacy: [facility]         Will file in chart as: TRUE METRIX BLOOD GLUCOSE TEST In Vitro Strip         Sig: TEST BLOOD SUGAR TWICE DAILY    Disp:  200 strip    Refills:

## 2020-02-24 RX ORDER — CALCIUM CITRATE/VITAMIN D3 200MG-6.25
TABLET ORAL
Qty: 200 STRIP | Refills: 0 | Status: SHIPPED | OUTPATIENT
Start: 2020-02-24 | End: 2020-05-28

## 2020-03-25 DIAGNOSIS — N32.81 OAB (OVERACTIVE BLADDER): ICD-10-CM

## 2020-03-25 RX ORDER — OXYBUTYNIN CHLORIDE 5 MG/1
5 TABLET ORAL 4 TIMES DAILY
Qty: 360 TABLET | Refills: 3 | Status: SHIPPED | OUTPATIENT
Start: 2020-03-25 | End: 2020-09-30 | Stop reason: CLARIF

## 2020-05-04 ENCOUNTER — TELEPHONE (OUTPATIENT)
Dept: FAMILY MEDICINE CLINIC | Facility: CLINIC | Age: 80
End: 2020-05-04

## 2020-05-04 NOTE — TELEPHONE ENCOUNTER
Received lab reports from [place], [place], phone [phone], Fax [phone] for COVID 19 and Respiratory Panel dated 4/28/20:    SARS COV 2:  N gene  Not Detected  ORF1ab Not Detected  S gene  Not Detected    Pathogens Detected:  Human herpe

## 2020-05-06 DIAGNOSIS — E78.2 MIXED HYPERLIPIDEMIA: ICD-10-CM

## 2020-05-06 DIAGNOSIS — N18.2 TYPE 2 DIABETES MELLITUS WITH STAGE 2 CHRONIC KIDNEY DISEASE, WITHOUT LONG-TERM CURRENT USE OF INSULIN (HCC): ICD-10-CM

## 2020-05-06 DIAGNOSIS — E11.22 TYPE 2 DIABETES MELLITUS WITH STAGE 2 CHRONIC KIDNEY DISEASE, WITHOUT LONG-TERM CURRENT USE OF INSULIN (HCC): ICD-10-CM

## 2020-05-07 NOTE — TELEPHONE ENCOUNTER
LMOM for patient to call office to schedule telemedicine visit with Dr. Gold Subramanian for med refills.

## 2020-05-07 NOTE — TELEPHONE ENCOUNTER
Requested Prescriptions     Pending Prescriptions Disp Refills   • ATORVASTATIN 20 MG Oral Tab [Pharmacy Med Name: ATORVASTATIN CALCIUM 20 MG Tablet] 90 tablet 3     Sig: TAKE 1 TABLET EVERY NIGHT   • GLIPIZIDE ER 2.5 MG Oral Tablet 24 Hr [Pharmacy Med Nam

## 2020-05-08 NOTE — TELEPHONE ENCOUNTER
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 There is only a lab sheet with results, so not sure of the clinical picture.  Please call pt or [facility] (paper with info placed in nurse triage basket)

## 2020-05-11 NOTE — TELEPHONE ENCOUNTER
Future Appointments   Date Time Provider Pratima Corral   5/12/2020  9:30 AM Yg Lemus, DO [facility] [address] [facility] [address]     Patient verbally consents to a virtual/telephone check-in service for the date and time noted above.  Patient understands and accept

## 2020-05-12 ENCOUNTER — VIRTUAL PHONE E/M (OUTPATIENT)
Dept: FAMILY MEDICINE CLINIC | Facility: CLINIC | Age: 80
End: 2020-05-12
Payer: MEDICARE

## 2020-05-12 ENCOUNTER — TELEPHONE (OUTPATIENT)
Dept: FAMILY MEDICINE CLINIC | Facility: CLINIC | Age: 80
End: 2020-05-12

## 2020-05-12 DIAGNOSIS — E78.2 MIXED HYPERLIPIDEMIA: ICD-10-CM

## 2020-05-12 DIAGNOSIS — N18.2 TYPE 2 DIABETES MELLITUS WITH STAGE 2 CHRONIC KIDNEY DISEASE, WITHOUT LONG-TERM CURRENT USE OF INSULIN (HCC): ICD-10-CM

## 2020-05-12 DIAGNOSIS — Z00.00 LABORATORY EXAM ORDERED AS PART OF ROUTINE GENERAL MEDICAL EXAMINATION: Primary | ICD-10-CM

## 2020-05-12 DIAGNOSIS — E11.22 TYPE 2 DIABETES MELLITUS WITH STAGE 2 CHRONIC KIDNEY DISEASE, WITHOUT LONG-TERM CURRENT USE OF INSULIN (HCC): ICD-10-CM

## 2020-05-12 PROCEDURE — 99441 PHONE E/M BY PHYS 5-10 MIN: CPT | Performed by: FAMILY MEDICINE

## 2020-05-12 RX ORDER — ATORVASTATIN CALCIUM 20 MG/1
TABLET, FILM COATED ORAL
Qty: 90 TABLET | Refills: 1 | Status: SHIPPED | OUTPATIENT
Start: 2020-05-12 | End: 2020-09-17

## 2020-05-12 RX ORDER — GLIPIZIDE 2.5 MG/1
TABLET, EXTENDED RELEASE ORAL
Qty: 90 TABLET | Refills: 1 | Status: SHIPPED | OUTPATIENT
Start: 2020-05-12 | End: 2020-09-17

## 2020-05-12 NOTE — TELEPHONE ENCOUNTER
Faxed lab orders to [facility] at [phone] per Dr Agatha Krause request.

## 2020-05-12 NOTE — PROGRESS NOTES
Virtual/Telephone Check-In    Oumar Astudillo verbally consents to a Virtual/Telephone Check-In service on 05/12/20. Patient understands and accepts financial responsibility for any deductible, co-insurance and/or co-pays associated with this service.     Du strip, Rfl: 0  •  Budesonide-Formoterol Fumarate (SYMBICORT) 160-4.5 MCG/ACT Inhalation Aerosol, Inhale 2 puffs into the lungs 2 (two) times daily. , Disp: 1 Inhaler, Rfl: 0  •  Memantine HCl 10 MG Oral Tab, TAKE 1 TABLET TWICE DAILY, Disp: 180 tablet, Rfl: 1  - COMP METABOLIC PANEL (14); Future  - HEMOGLOBIN A1C; Future    3. Laboratory exam ordered as part of routine general medical examination  - COMP METABOLIC PANEL (14); Future  - LIPID PANEL; Future  - CBC WITH DIFFERENTIAL WITH PLATELET;  Future  - TSH

## 2020-05-13 RX ORDER — GLIPIZIDE 2.5 MG/1
TABLET, EXTENDED RELEASE ORAL
Qty: 90 TABLET | Refills: 3 | OUTPATIENT
Start: 2020-05-13

## 2020-05-13 RX ORDER — ATORVASTATIN CALCIUM 20 MG/1
TABLET, FILM COATED ORAL
Qty: 90 TABLET | Refills: 3 | OUTPATIENT
Start: 2020-05-13

## 2020-05-14 ENCOUNTER — NURSE ONLY (OUTPATIENT)
Dept: LAB | Age: 80
End: 2020-05-14
Attending: FAMILY MEDICINE
Payer: MEDICARE

## 2020-05-14 DIAGNOSIS — E11.22 TYPE 2 DIABETES MELLITUS WITH STAGE 2 CHRONIC KIDNEY DISEASE, WITHOUT LONG-TERM CURRENT USE OF INSULIN (HCC): ICD-10-CM

## 2020-05-14 DIAGNOSIS — N18.2 TYPE 2 DIABETES MELLITUS WITH STAGE 2 CHRONIC KIDNEY DISEASE, WITHOUT LONG-TERM CURRENT USE OF INSULIN (HCC): ICD-10-CM

## 2020-05-14 DIAGNOSIS — E78.2 MIXED HYPERLIPIDEMIA: ICD-10-CM

## 2020-05-14 DIAGNOSIS — Z00.00 LABORATORY EXAM ORDERED AS PART OF ROUTINE GENERAL MEDICAL EXAMINATION: ICD-10-CM

## 2020-05-14 PROCEDURE — 80053 COMPREHEN METABOLIC PANEL: CPT

## 2020-05-14 PROCEDURE — 83036 HEMOGLOBIN GLYCOSYLATED A1C: CPT

## 2020-05-14 PROCEDURE — 85025 COMPLETE CBC W/AUTO DIFF WBC: CPT

## 2020-05-14 PROCEDURE — 80061 LIPID PANEL: CPT

## 2020-05-14 PROCEDURE — 84443 ASSAY THYROID STIM HORMONE: CPT

## 2020-05-14 PROCEDURE — 36415 COLL VENOUS BLD VENIPUNCTURE: CPT

## 2020-05-28 RX ORDER — CALCIUM CITRATE/VITAMIN D3 200MG-6.25
TABLET ORAL
Qty: 200 STRIP | Refills: 1 | Status: SHIPPED | OUTPATIENT
Start: 2020-05-28 | End: 2020-09-30 | Stop reason: CLARIF

## 2020-06-08 ENCOUNTER — TELEPHONE (OUTPATIENT)
Dept: FAMILY MEDICINE CLINIC | Facility: CLINIC | Age: 80
End: 2020-06-08

## 2020-06-08 NOTE — TELEPHONE ENCOUNTER
Dr.Broton- Jin's daughter Mary Kang is calling to let you know [employer] called to let her know approval is needed for   [address] [phone], [phone]   Outpatient Medication Detail      Disp Refills

## 2020-06-11 NOTE — TELEPHONE ENCOUNTER
Patient gets this medication from Dr. Nerissa Molina, neurologist.  Vane Huerta has been called and notified.

## 2020-08-03 NOTE — TELEPHONE ENCOUNTER
Rx for Rivastigmine currently pending with neuro office. Pt's states her neurologist Dr. Jelly Tang is no longer at that office and she is looking for recommendation for new neurologist for her Alzheimers/dementia.

## 2020-08-03 NOTE — TELEPHONE ENCOUNTER
Pt will need a new prescription for Rivastigmine Tartrate 6 mg. This was previously prescribed by a different physician, who is no longer at her previous practice. Please send to the [facility] pharmacy-can be faxed to [phone].  Patient will also need name

## 2020-08-04 NOTE — TELEPHONE ENCOUNTER
Pt's daughter informed of information below, referral information provided. Daughter verbalizes understanding.

## 2020-09-17 ENCOUNTER — OFFICE VISIT (OUTPATIENT)
Dept: FAMILY MEDICINE CLINIC | Facility: CLINIC | Age: 80
End: 2020-09-17
Payer: MEDICARE

## 2020-09-17 ENCOUNTER — LAB ENCOUNTER (OUTPATIENT)
Dept: LAB | Age: 80
End: 2020-09-17
Attending: FAMILY MEDICINE
Payer: MEDICARE

## 2020-09-17 VITALS
SYSTOLIC BLOOD PRESSURE: 130 MMHG | HEART RATE: 65 BPM | BODY MASS INDEX: 24.56 KG/M2 | RESPIRATION RATE: 16 BRPM | TEMPERATURE: 97 F | WEIGHT: 133.5 LBS | HEIGHT: 62 IN | DIASTOLIC BLOOD PRESSURE: 62 MMHG

## 2020-09-17 DIAGNOSIS — E11.22 TYPE 2 DIABETES MELLITUS WITH STAGE 2 CHRONIC KIDNEY DISEASE, WITHOUT LONG-TERM CURRENT USE OF INSULIN (HCC): ICD-10-CM

## 2020-09-17 DIAGNOSIS — G30.1 LATE ONSET ALZHEIMER'S DISEASE WITHOUT BEHAVIORAL DISTURBANCE (HCC): ICD-10-CM

## 2020-09-17 DIAGNOSIS — M85.89 OSTEOPENIA OF MULTIPLE SITES: ICD-10-CM

## 2020-09-17 DIAGNOSIS — K75.81 NASH (NONALCOHOLIC STEATOHEPATITIS): ICD-10-CM

## 2020-09-17 DIAGNOSIS — E78.2 MIXED HYPERLIPIDEMIA: ICD-10-CM

## 2020-09-17 DIAGNOSIS — Z00.00 ENCOUNTER FOR ANNUAL HEALTH EXAMINATION: Primary | ICD-10-CM

## 2020-09-17 DIAGNOSIS — F02.80 LATE ONSET ALZHEIMER'S DISEASE WITHOUT BEHAVIORAL DISTURBANCE (HCC): ICD-10-CM

## 2020-09-17 DIAGNOSIS — N18.2 TYPE 2 DIABETES MELLITUS WITH STAGE 2 CHRONIC KIDNEY DISEASE, WITHOUT LONG-TERM CURRENT USE OF INSULIN (HCC): ICD-10-CM

## 2020-09-17 DIAGNOSIS — N32.81 OAB (OVERACTIVE BLADDER): ICD-10-CM

## 2020-09-17 DIAGNOSIS — E55.9 VITAMIN D DEFICIENCY: ICD-10-CM

## 2020-09-17 DIAGNOSIS — Z23 NEED FOR VACCINATION: ICD-10-CM

## 2020-09-17 LAB
ALBUMIN SERPL-MCNC: 4 G/DL (ref 3.4–5)
ALBUMIN/GLOB SERPL: 1.3 {RATIO} (ref 1–2)
ALP LIVER SERPL-CCNC: 51 U/L (ref 55–142)
ALT SERPL-CCNC: 19 U/L (ref 13–56)
ANION GAP SERPL CALC-SCNC: 4 MMOL/L (ref 0–18)
AST SERPL-CCNC: 18 U/L (ref 15–37)
BILIRUB SERPL-MCNC: 0.6 MG/DL (ref 0.1–2)
BUN BLD-MCNC: 8 MG/DL (ref 7–18)
BUN/CREAT SERPL: 13.1 (ref 10–20)
CALCIUM BLD-MCNC: 9.5 MG/DL (ref 8.5–10.1)
CHLORIDE SERPL-SCNC: 104 MMOL/L (ref 98–112)
CHOLEST SMN-MCNC: 117 MG/DL (ref ?–200)
CO2 SERPL-SCNC: 31 MMOL/L (ref 21–32)
CREAT BLD-MCNC: 0.61 MG/DL (ref 0.55–1.02)
CREAT UR-SCNC: 35.8 MG/DL
EST. AVERAGE GLUCOSE BLD GHB EST-MCNC: 160 MG/DL (ref 68–126)
GLOBULIN PLAS-MCNC: 3.1 G/DL (ref 2.8–4.4)
GLUCOSE BLD-MCNC: 126 MG/DL (ref 70–99)
HBA1C MFR BLD HPLC: 7.2 % (ref ?–5.7)
HDLC SERPL-MCNC: 62 MG/DL (ref 40–59)
LDLC SERPL CALC-MCNC: 39 MG/DL (ref ?–100)
M PROTEIN MFR SERPL ELPH: 7.1 G/DL (ref 6.4–8.2)
MICROALBUMIN UR-MCNC: <0.5 MG/DL
NONHDLC SERPL-MCNC: 55 MG/DL (ref ?–130)
OSMOLALITY SERPL CALC.SUM OF ELEC: 288 MOSM/KG (ref 275–295)
PATIENT FASTING Y/N/NP: YES
PATIENT FASTING Y/N/NP: YES
POTASSIUM SERPL-SCNC: 4.1 MMOL/L (ref 3.5–5.1)
SODIUM SERPL-SCNC: 139 MMOL/L (ref 136–145)
TRIGL SERPL-MCNC: 80 MG/DL (ref 30–149)
VIT D+METAB SERPL-MCNC: 12.6 NG/ML (ref 30–100)
VLDLC SERPL CALC-MCNC: 16 MG/DL (ref 0–30)

## 2020-09-17 PROCEDURE — G0008 ADMIN INFLUENZA VIRUS VAC: HCPCS | Performed by: FAMILY MEDICINE

## 2020-09-17 PROCEDURE — 3075F SYST BP GE 130 - 139MM HG: CPT | Performed by: FAMILY MEDICINE

## 2020-09-17 PROCEDURE — 82570 ASSAY OF URINE CREATININE: CPT

## 2020-09-17 PROCEDURE — 96160 PT-FOCUSED HLTH RISK ASSMT: CPT | Performed by: FAMILY MEDICINE

## 2020-09-17 PROCEDURE — G0439 PPPS, SUBSEQ VISIT: HCPCS | Performed by: FAMILY MEDICINE

## 2020-09-17 PROCEDURE — 36415 COLL VENOUS BLD VENIPUNCTURE: CPT

## 2020-09-17 PROCEDURE — 80061 LIPID PANEL: CPT

## 2020-09-17 PROCEDURE — 90662 IIV NO PRSV INCREASED AG IM: CPT | Performed by: FAMILY MEDICINE

## 2020-09-17 PROCEDURE — 3078F DIAST BP <80 MM HG: CPT | Performed by: FAMILY MEDICINE

## 2020-09-17 PROCEDURE — 83036 HEMOGLOBIN GLYCOSYLATED A1C: CPT

## 2020-09-17 PROCEDURE — 80053 COMPREHEN METABOLIC PANEL: CPT

## 2020-09-17 PROCEDURE — 99397 PER PM REEVAL EST PAT 65+ YR: CPT | Performed by: FAMILY MEDICINE

## 2020-09-17 PROCEDURE — 3008F BODY MASS INDEX DOCD: CPT | Performed by: FAMILY MEDICINE

## 2020-09-17 PROCEDURE — 82306 VITAMIN D 25 HYDROXY: CPT

## 2020-09-17 PROCEDURE — 82043 UR ALBUMIN QUANTITATIVE: CPT

## 2020-09-17 RX ORDER — MEMANTINE HYDROCHLORIDE 10 MG/1
TABLET ORAL
Qty: 180 TABLET | Refills: 1 | Status: SHIPPED | OUTPATIENT
Start: 2020-09-17 | End: 2020-09-30 | Stop reason: CLARIF

## 2020-09-17 RX ORDER — GLIPIZIDE 2.5 MG/1
TABLET, EXTENDED RELEASE ORAL
Qty: 90 TABLET | Refills: 1 | Status: SHIPPED | OUTPATIENT
Start: 2020-09-17 | End: 2020-09-30 | Stop reason: CLARIF

## 2020-09-17 RX ORDER — ATORVASTATIN CALCIUM 20 MG/1
TABLET, FILM COATED ORAL
Qty: 90 TABLET | Refills: 1 | Status: SHIPPED | OUTPATIENT
Start: 2020-09-17 | End: 2020-09-30 | Stop reason: CLARIF

## 2020-09-17 NOTE — PROGRESS NOTES
HPI:   Meghan Arzate is a [de-identified]year old female who presents for a MA (Medicare Advantage) [facility] (Once per calendar year). Doing well. Lives in [facility]. Daughter comes to all appts.   She can visit outdoors for 1 hr now, starting to ease some covi activities?: 0-No  Have you had any memory issues?: 1-Yes  Fall/Risk Scorin  Scoring Interpretation: 4+ At Risk      Cognitive Assessment     What day of the week is this?: Correct  What month is it?: Correct  What year is it?: Correct  Recall \"Ball\" Marybeth Pruitt MD ([facility] 8163)  Aramis Hackett (Physician Assistant)  Scar Ingram MD ([address] [place] [address])  Cesar Mckeon DO as PCP (Family Medicine)  Gabriel Menard MD (SURGERY, GENERAL)  Eboni Jade MD (OPHTHALMOLOGY)    Patient Anastasiia Vizcarra (FOUR) TIMES DAILY. TRUEPLUS LANCETS 28G Does not apply Misc, TEST TWO TIMES DAILY  Calcium Carbonate-Vit D-Min (CALCIUM 1200 OR), Take 1 tablet by mouth daily. Cholecalciferol (VITAMIN D) 1000 UNITS Oral Tab, Take 1,000 Units by mouth daily.        MEDIC heartburn  : denies dysuria, vaginal discharge or itching, + urinary incontinence  MUSCULOSKELETAL: denies back pain  NEURO: denies headaches  PSYCHE: denies depression or anxiety  HEMATOLOGIC: denies hx of anemia  ENDOCRINE: denies thyroid history  ALL/ History     Immunization History   Administered Date(s) Administered   • >=3 YRS TRI  MULTIDOSE VIAL (18821) FLU CLINIC 12/15/2014   • FLU VAC High Dose 65 YRS & Older PRSV Free (21448) 09/10/2019, 09/17/2020   • FLUAD High Dose 72 yr and older (73450) 12/ assessment: good     PLAN:  The patient indicates understanding of these issues and agrees to the plan. Reinforced healthy diet, lifestyle, and exercise. Return in 6 months (on 3/17/2021) for diabetes, or sooner if needed.      Marsha Phelps DO, 9/17 Dilated Eye Exam 7/17/2019       Bone Density Screening      Dexascan Every two years Last Dexa Scan:   XR DEXA BONE DENSITOMETRY (CPT=77080) 06/11/2018    No flowsheet data found.     Pap and Pelvic      Pap: Every 3 yrs age 21-65 or Pap+HPV every 5 yrs ag flowsheet data found.     Creat/alb ratio  Annually Malb/Cre Calc (ug/mg)   Date Value   10/11/2019 13.2     MALB/CRE CALC (ug/mg)   Date Value   11/21/2012 UNABLE TO CALCULATE        LDL  Annually LDL Cholesterol (mg/dL)   Date Value   05/14/2020 50     LD

## 2020-09-17 NOTE — PATIENT INSTRUCTIONS
April Casiano's SCREENING SCHEDULE   Tests on this list are recommended by your physician but may not be covered, or covered at this frequency, by your insurer. Please check with your insurance carrier before scheduling to verify coverage.    PREVENTATIVE S visit.  Limited to patients who meet one of the following criteria:   • Men who are 73-68 years old and have smoked more than 100 cigarettes in their lifetime   • Anyone with a family history    Colorectal Cancer Screening  Covered up to Age 76     Colonosc 75 There are no preventive care reminders to display for this patient.  Please get this Mammogram regularly   Immunizations      Influenza  Covered Annually Orders placed or performed in visit on 09/17/20   • FLU VACC HIGH DOSE PRSV FREE   Orders placed or form from the [facility]. [email or web address]  A link to the [employer].  This site has a lot of good information including definitions of the different types of Advance Dire

## 2020-09-30 ENCOUNTER — APPOINTMENT (OUTPATIENT)
Dept: CT IMAGING | Facility: HOSPITAL | Age: 80
End: 2020-09-30
Attending: HOSPITALIST
Payer: MEDICARE

## 2020-09-30 ENCOUNTER — HOSPITAL ENCOUNTER (OUTPATIENT)
Facility: HOSPITAL | Age: 80
Setting detail: OBSERVATION
Discharge: HOME | End: 2020-10-01
Attending: EMERGENCY MEDICINE | Admitting: HOSPITALIST
Payer: MEDICARE

## 2020-09-30 ENCOUNTER — APPOINTMENT (OUTPATIENT)
Dept: GENERAL RADIOLOGY | Facility: HOSPITAL | Age: 80
End: 2020-09-30
Attending: EMERGENCY MEDICINE
Payer: MEDICARE

## 2020-09-30 DIAGNOSIS — R07.9 ACUTE CHEST PAIN: Primary | ICD-10-CM

## 2020-09-30 DIAGNOSIS — R77.8 ELEVATED TROPONIN: ICD-10-CM

## 2020-09-30 DIAGNOSIS — E11.22 TYPE 2 DIABETES MELLITUS WITH STAGE 2 CHRONIC KIDNEY DISEASE, WITHOUT LONG-TERM CURRENT USE OF INSULIN (HCC): ICD-10-CM

## 2020-09-30 DIAGNOSIS — N18.2 TYPE 2 DIABETES MELLITUS WITH STAGE 2 CHRONIC KIDNEY DISEASE, WITHOUT LONG-TERM CURRENT USE OF INSULIN (HCC): ICD-10-CM

## 2020-09-30 PROBLEM — R79.89 ELEVATED TROPONIN: Status: ACTIVE | Noted: 2020-09-30

## 2020-09-30 PROCEDURE — 99220 INITIAL OBSERVATION CARE,LEVL III: CPT | Performed by: HOSPITALIST

## 2020-09-30 PROCEDURE — 70450 CT HEAD/BRAIN W/O DYE: CPT | Performed by: HOSPITALIST

## 2020-09-30 PROCEDURE — 71045 X-RAY EXAM CHEST 1 VIEW: CPT | Performed by: EMERGENCY MEDICINE

## 2020-09-30 PROCEDURE — 99205 OFFICE O/P NEW HI 60 MIN: CPT | Performed by: INTERNAL MEDICINE

## 2020-09-30 RX ORDER — DEXTROSE MONOHYDRATE 25 G/50ML
50 INJECTION, SOLUTION INTRAVENOUS
Status: DISCONTINUED | OUTPATIENT
Start: 2020-09-30 | End: 2020-10-01

## 2020-09-30 RX ORDER — ATORVASTATIN CALCIUM 20 MG/1
20 TABLET, FILM COATED ORAL NIGHTLY
Status: DISCONTINUED | OUTPATIENT
Start: 2020-09-30 | End: 2020-10-01

## 2020-09-30 RX ORDER — POLYETHYLENE GLYCOL 3350 17 G/17G
17 POWDER, FOR SOLUTION ORAL DAILY PRN
Status: DISCONTINUED | OUTPATIENT
Start: 2020-09-30 | End: 2020-10-01

## 2020-09-30 RX ORDER — MEMANTINE HYDROCHLORIDE 10 MG/1
10 TABLET ORAL 2 TIMES DAILY
Status: DISCONTINUED | OUTPATIENT
Start: 2020-09-30 | End: 2020-10-01

## 2020-09-30 RX ORDER — ENOXAPARIN SODIUM 100 MG/ML
40 INJECTION SUBCUTANEOUS DAILY
Status: DISCONTINUED | OUTPATIENT
Start: 2020-10-01 | End: 2020-10-01

## 2020-09-30 RX ORDER — OXYBUTYNIN CHLORIDE 5 MG/1
5 TABLET ORAL 4 TIMES DAILY
Status: DISCONTINUED | OUTPATIENT
Start: 2020-09-30 | End: 2020-10-01

## 2020-09-30 RX ORDER — GLIPIZIDE 2.5 MG/1
2.5 TABLET, EXTENDED RELEASE ORAL
COMMUNITY
End: 2021-01-21

## 2020-09-30 RX ORDER — ASPIRIN 325 MG
325 TABLET ORAL ONCE
Status: COMPLETED | OUTPATIENT
Start: 2020-09-30 | End: 2020-09-30

## 2020-09-30 RX ORDER — ACETAMINOPHEN 325 MG/1
650 TABLET ORAL EVERY 6 HOURS PRN
Status: DISCONTINUED | OUTPATIENT
Start: 2020-09-30 | End: 2020-10-01

## 2020-09-30 RX ORDER — OXYBUTYNIN CHLORIDE 5 MG/1
5 TABLET ORAL 4 TIMES DAILY
COMMUNITY
End: 2020-12-06

## 2020-09-30 RX ORDER — KETOROLAC TROMETHAMINE 30 MG/ML
15 INJECTION, SOLUTION INTRAMUSCULAR; INTRAVENOUS ONCE
Status: COMPLETED | OUTPATIENT
Start: 2020-09-30 | End: 2020-09-30

## 2020-09-30 RX ORDER — RIVASTIGMINE TARTRATE 6 MG/1
6 CAPSULE ORAL 2 TIMES DAILY
Status: DISCONTINUED | OUTPATIENT
Start: 2020-09-30 | End: 2020-10-01

## 2020-09-30 RX ORDER — ONDANSETRON 2 MG/ML
8 INJECTION INTRAMUSCULAR; INTRAVENOUS EVERY 6 HOURS PRN
Status: DISCONTINUED | OUTPATIENT
Start: 2020-09-30 | End: 2020-10-01

## 2020-09-30 RX ORDER — MEMANTINE HYDROCHLORIDE 10 MG/1
10 TABLET ORAL 2 TIMES DAILY
COMMUNITY
End: 2020-12-09

## 2020-09-30 RX ORDER — ATORVASTATIN CALCIUM 20 MG/1
20 TABLET, FILM COATED ORAL NIGHTLY
COMMUNITY
End: 2021-01-21

## 2020-09-30 RX ORDER — ONDANSETRON 2 MG/ML
4 INJECTION INTRAMUSCULAR; INTRAVENOUS ONCE
Status: COMPLETED | OUTPATIENT
Start: 2020-09-30 | End: 2020-09-30

## 2020-09-30 RX ORDER — BISACODYL 10 MG
10 SUPPOSITORY, RECTAL RECTAL
Status: DISCONTINUED | OUTPATIENT
Start: 2020-09-30 | End: 2020-10-01

## 2020-09-30 NOTE — H&P
ESSENCE HOSPITALIST  History and Physical     [facility] [place] Patient Status:  Emergency    1940 MRN [id number]   Location [address] [facility] Attending Orvan Boxer, MD   Hosp Day # 0 PCP Amada Welch DO     Chief Complaint:head included cigarettes. She smoked 1.00 pack per day. She has never used smokeless tobacco. She reports that she does not drink alcohol or use drugs.     Family History:   Family History   Problem Relation Age of Onset   • Breast Cancer Sister 72        estima Normocephalic atraumatic. Moist mucous membranes. EOM-I. PERRLA. Anicteric. Neck: No lymphadenopathy. No JVD. No carotid bruits. Respiratory: Clear to auscultation bilaterally. No wheezes. No rhonchi. Cardiovascular: S1, S2. Regular rate and rhythm.  No

## 2020-09-30 NOTE — CONSULTS
[facility]  Report of Consultation    Alexandr Dickson Patient Status:  Emergency    1940 MRN [id number]   Location [address] [facility] Attending Regulo Pierce MD   Hosp Day # 0 PCP Sol Proctor DO     Reason for Consultation: ENDOSCOPY   • HERNIA SURGERY      30 years ago   • KRYSTAL BIOPSY STEREO NODULE 1 SITE LEFT (CPT=19081) Left 05/2017    benign.    • KRYSTAL BIOPSY STEREO NODULE 2 SITE BILAT (CPT=19081/85803)  05/2017    benign     Family History   Problem Relation Age of Onset CO2 27.0 09/30/2020     09/30/2020    CA 9.0 09/30/2020    ALB 4.0 09/30/2020    ALKPHO 73 09/30/2020    BILT 0.6 09/30/2020    TP 7.1 09/30/2020    AST 19 09/30/2020    ALT 23 09/30/2020    DDIMER 0.29 09/30/2020    TROP 0.050 09/30/2020     Assess

## 2020-09-30 NOTE — ED INITIAL ASSESSMENT (HPI)
Pt presented to ED c/o left sided headache radiating down left neck to shoulder and arm.  On arrival pt nauseous and had one episode of emesis (bilous and yellow) pt b/p elevated

## 2020-09-30 NOTE — ED NOTES
Nurse to Nurse report called to [facility].. Pt resting on cart denies pain at present daughter at bedside. Pt okay to floor. Pt transport ordered.

## 2020-09-30 NOTE — ED PROVIDER NOTES
Patient Seen in: [facility] Emergency Department      History   Patient presents with:  Chest Pain Angina    Stated Complaint: Shoulder Pain    HPI  58-year-old female comes to the hospital complaint of having difficulty with left-sided chest pain. SITE [facility] (MDM=54476/06596)  05/2017    benign                    Social History    Tobacco Use      Smoking status: Former Smoker        Packs/day: 1.00        Types: Cigarettes      Smokeless tobacco: Never Used      Tobacco comment: quit many years ago PLATELET.   Procedure                               Abnormality         Status                     ---------                               -----------         ------                     CBC W/ DIFFERENTIAL[[id number]]                              Final resul work-up and care at this time.   Admission disposition: 9/30/2020  2:07 PM                   Disposition and Plan     Clinical Impression:  Acute chest pain  (primary encounter diagnosis)  Elevated troponin    Disposition:  Admit  9/30/2020  2:07 pm    Foll

## 2020-10-01 ENCOUNTER — APPOINTMENT (OUTPATIENT)
Dept: CT IMAGING | Facility: HOSPITAL | Age: 80
End: 2020-10-01
Attending: INTERNAL MEDICINE
Payer: MEDICARE

## 2020-10-01 ENCOUNTER — APPOINTMENT (OUTPATIENT)
Dept: CV DIAGNOSTICS | Facility: HOSPITAL | Age: 80
End: 2020-10-01
Attending: INTERNAL MEDICINE
Payer: MEDICARE

## 2020-10-01 VITALS
HEIGHT: 63 IN | RESPIRATION RATE: 18 BRPM | WEIGHT: 133.81 LBS | OXYGEN SATURATION: 100 % | SYSTOLIC BLOOD PRESSURE: 104 MMHG | DIASTOLIC BLOOD PRESSURE: 76 MMHG | BODY MASS INDEX: 23.71 KG/M2 | TEMPERATURE: 98 F | HEART RATE: 65 BPM

## 2020-10-01 PROCEDURE — 93306 TTE W/DOPPLER COMPLETE: CPT | Performed by: INTERNAL MEDICINE

## 2020-10-01 PROCEDURE — 75574 CT ANGIO HRT W/3D IMAGE: CPT | Performed by: INTERNAL MEDICINE

## 2020-10-01 PROCEDURE — 99214 OFFICE O/P EST MOD 30 MIN: CPT | Performed by: INTERNAL MEDICINE

## 2020-10-01 PROCEDURE — 99217 OBSERVATION CARE DISCHARGE: CPT | Performed by: HOSPITALIST

## 2020-10-01 RX ORDER — ASPIRIN 81 MG/1
81 TABLET, CHEWABLE ORAL DAILY
Qty: 30 TABLET | Refills: 0 | Status: SHIPPED | COMMUNITY
Start: 2020-10-02

## 2020-10-01 RX ORDER — MAGNESIUM OXIDE 400 MG (241.3 MG MAGNESIUM) TABLET
400 TABLET ONCE
Status: COMPLETED | OUTPATIENT
Start: 2020-10-01 | End: 2020-10-01

## 2020-10-01 RX ORDER — NITROGLYCERIN 0.4 MG/1
0.4 TABLET SUBLINGUAL ONCE
Status: COMPLETED | OUTPATIENT
Start: 2020-10-01 | End: 2020-10-01

## 2020-10-01 RX ORDER — METOPROLOL TARTRATE 5 MG/5ML
5 INJECTION INTRAVENOUS SEE ADMIN INSTRUCTIONS
Status: DISCONTINUED | OUTPATIENT
Start: 2020-10-01 | End: 2020-10-01 | Stop reason: HOSPADM

## 2020-10-01 RX ORDER — NITROGLYCERIN 0.4 MG/1
TABLET SUBLINGUAL
Status: COMPLETED
Start: 2020-10-01 | End: 2020-10-01

## 2020-10-01 RX ORDER — DILTIAZEM HYDROCHLORIDE 5 MG/ML
5 INJECTION INTRAVENOUS SEE ADMIN INSTRUCTIONS
Status: DISCONTINUED | OUTPATIENT
Start: 2020-10-01 | End: 2020-10-01 | Stop reason: HOSPADM

## 2020-10-01 RX ORDER — ASPIRIN 81 MG/1
81 TABLET, CHEWABLE ORAL DAILY
Status: DISCONTINUED | OUTPATIENT
Start: 2020-10-01 | End: 2020-10-01

## 2020-10-01 NOTE — PLAN OF CARE
Patient to return back to [facility]. [address] [facility] ED Case manager confirmed with facility it is ok to return tonight. Called and spoke to Danial Steven CNA who is the caregiver in the facility on duty. Report given to Danial Steven.  Patient and her daughter v/u

## 2020-10-01 NOTE — IMAGING NOTE
Received Brittney Ree to CT Rm 4 GE working with [address]. Verified name, , allergies. Pt denies use of long acting nitrates like, Imdur, Cialis, Levitra and Viagra. IV access with 20G angiocath to left  antecubital area.  O2 applied via nasal cannul

## 2020-10-01 NOTE — PROGRESS NOTES
S1S2  NSR  RRR   Crackles at bases on RA denies cough, SOB  No edema    CT chest now  Pt asking to go home  Da at bedside    Hopefully dc later if CT scan is normal, echo PEF  No WMA    Brandin Saez, NP

## 2020-10-01 NOTE — PLAN OF CARE
Dr. Laith Horowitz here to explain CTA results wnl. Patient ready for d/c. Spoke with ED case manager and he is calling [facility] to arrange transfer back to Assisted Living.

## 2020-10-01 NOTE — PLAN OF CARE
Assumed pt care around 0730. Pt denies CP, SOB, dizziness, lightheadedness, or N/V. Pt AOX3, forgetful. Pt up w/ SBA, continent of b/b. Pt education provided on medication, advanced directives, gated CTA, and POC. Pt verbalized understanding.  All needs met

## 2020-10-01 NOTE — PROGRESS NOTES
[facility] Cardiology Progress Note    Subjective:  She had a good night, and has no complaints for me this am. She said she came to hospital yesterday after having a terrible L sided headachy, and emesis several times.      Objective:  BP (!) 79/36 (BP Locati explained due to her underlying dementia. Bedside echocardiogram reviewed and preserved LV and RV size and systolic function, no RWMA, no pericardial effusion and no major valvular regurgitation/stenosis.   Usually would recommend cardiac angiogram for def

## 2020-10-01 NOTE — PROGRESS NOTES
Received report on, and this Pt., from ER to room 2627 at 1600. Pt., awake, A&Ox3, forgetful at times, has dementia, calm, pleasant and cooperative. Pt., is in SR on Tele monitor, sats greater than 92% on RA, denies any pain or distress.  Pt., up to the BR

## 2020-10-01 NOTE — PLAN OF CARE
Alert and oriented x3  SR on tele  Denies any pain  Stress test   Plan of care discussed with pt  Problem: Patient/Family Goals  Goal: Patient/Family Long Term Goal  Description: Patient's Long Term Goal:     Interventions:  -   - See additional Care Plan

## 2020-10-02 ENCOUNTER — PATIENT OUTREACH (OUTPATIENT)
Dept: CASE MANAGEMENT | Age: 80
End: 2020-10-02

## 2020-10-02 DIAGNOSIS — Z02.9 ENCOUNTERS FOR UNSPECIFIED ADMINISTRATIVE PURPOSE: ICD-10-CM

## 2020-10-02 PROCEDURE — 1111F DSCHRG MED/CURRENT MED MERGE: CPT

## 2020-10-02 NOTE — PROGRESS NOTES
Initial Post Discharge Follow Up   Discharge Date: 10/1/20  Contact Date: 10/2/2020    Consent Verification:  Assessment Completed With: Caregiver: Jane montoya received per patient?  written  HIPAA Verified?   Yes    Discharge Dx:     Chest pa Memantine HCl 10 MG Oral Tab Take 10 mg by mouth 2 (two) times daily. • glipiZIDE ER 2.5 MG Oral Tablet 24 Hr Take 2.5 mg by mouth daily with breakfast.     • Oxybutynin Chloride 5 MG Oral Tab Take 5 mg by mouth 4 (four) times daily.      • Rivastigmine not think pt needs to f/u with cardiology. She states she plans to talk about this with PCP during [place]. Is there any reason as to why you cannot make your appointments?    No     NCM Reviewed upcoming Specialist Appt with patient     No    Overall Rat

## 2020-10-02 NOTE — DISCHARGE SUMMARY
[facility]OSPITAL[facility]  DISCHARGE SUMMARY     Nick Dudley Patient Status:  Observation    1940 MRN [id number]   [facility]NE-A Attending No att. providers found   Hosp Day # 0 PCP Edilberto Santamaria DO     Date of Admission: 2020  Date care provider. Take 1 tablet (1,000 mg total) by mouth 2 (two) times daily with meals.    Quantity: 180 tablet  Refills: 1        CONTINUE taking these medications      Instructions Prescription details   atorvastatin 20 MG Tabs  Commonly known as: LIP nondistended. Musculoskeletal: Moves all extremities. Extremities: No edema.   -----------------------------------------------------------------------------------------------  PATIENT DISCHARGE INSTRUCTIONS: See electronic chart    Susan Ang MD

## 2020-10-07 ENCOUNTER — TELEPHONE (OUTPATIENT)
Dept: FAMILY MEDICINE CLINIC | Facility: CLINIC | Age: 80
End: 2020-10-07

## 2020-10-07 NOTE — TELEPHONE ENCOUNTER
Patient's son is trying to arrange a VV tomorrow for the hosp f/up at 6 but she is quarantined, he can't bring her in, trying to get help with the nurses there. I suggested a TV, any other ideas to help them?  He will call back later today with an update,

## 2020-10-07 NOTE — TELEPHONE ENCOUNTER
If pt does not know how to do a VV and the staff cannot help her, my only suggestion would be a TV. There is not much else we can do for her since she is quarantined.

## 2020-10-07 NOTE — TELEPHONE ENCOUNTER
Future Appointments   Date Time Provider Pratima Shayna   10/8/2020 11:00 AM Waldemar Lemus,  [facility] [address] [facility] [address]   10/26/2020 10:00 AM Jessenia Riggins MD [facility] [place]     Changed to TV.

## 2020-10-08 ENCOUNTER — OFFICE VISIT (OUTPATIENT)
Dept: FAMILY MEDICINE CLINIC | Facility: CLINIC | Age: 80
End: 2020-10-08
Payer: MEDICARE

## 2020-10-08 DIAGNOSIS — R51.9 ACUTE NONINTRACTABLE HEADACHE, UNSPECIFIED HEADACHE TYPE: Primary | ICD-10-CM

## 2020-10-08 PROCEDURE — 1111F DSCHRG MED/CURRENT MED MERGE: CPT | Performed by: FAMILY MEDICINE

## 2020-10-08 PROCEDURE — 99442 PHONE E/M BY PHYS 11-20 MIN: CPT | Performed by: FAMILY MEDICINE

## 2020-10-13 ENCOUNTER — TELEPHONE (OUTPATIENT)
Dept: FAMILY MEDICINE CLINIC | Facility: CLINIC | Age: 80
End: 2020-10-13

## 2020-10-13 NOTE — TELEPHONE ENCOUNTER
Reason for the order/referral:NEUROLOGY/CONSULT   PCP: Cherylene Councilman   Refer to Provider ([address]   Patient Insurance: Payor: [employer] MCR / Plan: Leeanna Kraft MA HMO / Product Type: Medicare /   Duration/Summary of Symptoms: ONGOING   Is this

## 2020-10-21 RX ORDER — CALCIUM CITRATE/VITAMIN D3 200MG-6.25
TABLET ORAL
Qty: 200 STRIP | Refills: 0 | Status: SHIPPED | OUTPATIENT
Start: 2020-10-21 | End: 2021-01-21

## 2020-10-21 NOTE — TELEPHONE ENCOUNTER
Requested Prescriptions     Name from pharmacy: [facility]         Will file in chart as: TRUE METRIX BLOOD GLUCOSE TEST In Vitro Strip    Sig: TEST BLOOD SUGAR TWICE DAILY    Disp:  200 strip    Refills:  0 (P

## 2020-11-10 NOTE — PROGRESS NOTES
Virtual/Telephone Check-In    Mayra Elias verbally consents to a Virtual/Telephone Check-In service on 11/10/20. Patient understands and accepts financial responsibility for any deductible, co-insurance and/or co-pays associated with this service.     Du without long-term current use of insulin (HCC)            stable, on metformin and glipizide, ctm a1c, GFR            and Cr closely           Current Outpatient Medications:   •  TRUE METRIX BLOOD GLUCOSE TEST In Vitro Strip, TEST BLOOD SUGAR TWICE DAILY, headaches  - has cardiology follow up, continue current meds  - eat small regular meals, maintain good hydration, regular exercise, tylenol prn headaches, f/u as needed    Risks, benefits, and alternatives of current treatment plan discussed in detail.   Monica Patel

## 2020-11-23 ENCOUNTER — PATIENT OUTREACH (OUTPATIENT)
Dept: CASE MANAGEMENT | Age: 80
End: 2020-11-23

## 2020-11-24 NOTE — TELEPHONE ENCOUNTER
Reason for the order/referral:NEUROLOGY/CONSULT   PCP: Allison Villalobos   Refer to Provider ([address]   Patient Insurance: Payor: [employer] MCR / Plan: Sharifa Costa [place] HMO / Product Type: Medicare /   Duration/Summary of Symptoms: ONGOING   Is thi

## 2020-11-24 NOTE — TELEPHONE ENCOUNTER
Patients daughter requested a referral to see Neuro Dr Carlie Dillon for her late on set of Alzheimer. I looked the provider up and he does appear to be in her network.   Referral entered

## 2020-12-04 ENCOUNTER — PATIENT OUTREACH (OUTPATIENT)
Dept: CASE MANAGEMENT | Age: 80
End: 2020-12-04

## 2020-12-04 DIAGNOSIS — F02.80 LATE ONSET ALZHEIMER'S DISEASE WITHOUT BEHAVIORAL DISTURBANCE (HCC): ICD-10-CM

## 2020-12-04 DIAGNOSIS — G30.1 LATE ONSET ALZHEIMER'S DISEASE WITHOUT BEHAVIORAL DISTURBANCE (HCC): ICD-10-CM

## 2020-12-04 DIAGNOSIS — E55.9 VITAMIN D DEFICIENCY: ICD-10-CM

## 2020-12-04 DIAGNOSIS — N18.2 TYPE 2 DIABETES MELLITUS WITH STAGE 2 CHRONIC KIDNEY DISEASE, WITHOUT LONG-TERM CURRENT USE OF INSULIN (HCC): ICD-10-CM

## 2020-12-04 DIAGNOSIS — E78.2 MIXED HYPERLIPIDEMIA: ICD-10-CM

## 2020-12-04 DIAGNOSIS — E11.22 TYPE 2 DIABETES MELLITUS WITH STAGE 2 CHRONIC KIDNEY DISEASE, WITHOUT LONG-TERM CURRENT USE OF INSULIN (HCC): ICD-10-CM

## 2020-12-04 NOTE — PROGRESS NOTES
I want to know a little about you. • What do you do for fun? *Taking Care of Plants. • Any hobbies? What Hobbies? *Taking Care of Plants. *[employer]. *Arts & Crafts. *Drawing. • What do you do for work?     *Retired ( w about your medications? Side effects? Etc.?    *No, patient has been on the  same medications for a couple years. • Do you feel you can afford your medications?     *Yes, Patient is on medicaid    Nutrition: Lets talk about eating habits  • Do you eat t you think you could take to work on this?     *Patient is losing memory so conversations with daughter helps and her staying active with her walking and getting             A better phone for her to use to communicate because she has a hard time using her p *Daughter is her main support system, but she does have two sons that check in once in a while. Diet:   *Diabetic-watches her sugars     Exercise :   *Walking around building and in hallways    Stress:   *Small stress about being quarantined.      Cur min medical record review, telephone communication, care plan updates where needed, and education. Provided acknowledgment and validation to patient's concerns. Monthly Minute Total including today:   43 Minutes.      Physical assessment, complete healt

## 2020-12-04 NOTE — TELEPHONE ENCOUNTER
Requested Prescriptions     Name from pharmacy: OXYBUTYNIN CHLORIDE 5 MG Tablet         Will file in chart as: OXYBUTYNIN CHLORIDE 5 MG Oral Tab    Sig: TAKE 1 TABLET FOUR TIMES DAILY    Disp:  360 tablet    Refills:  0 (Pharmacy requested: Not specified)

## 2020-12-04 NOTE — PROGRESS NOTES
[facility], JESUS ) I attempted to contact patient to do assessment call, no answer. Left detailed message including my contact information so that patient could return my phone call.      Reviewed Chart    Time Spent This Encounter: 1 Minute  Total Time Spent T

## 2020-12-06 RX ORDER — OXYBUTYNIN CHLORIDE 5 MG/1
TABLET ORAL
Qty: 360 TABLET | Refills: 1 | Status: SHIPPED | OUTPATIENT
Start: 2020-12-06 | End: 2021-08-18

## 2020-12-31 PROCEDURE — 99490 CHRNC CARE MGMT STAFF 1ST 20: CPT

## 2021-01-05 ENCOUNTER — PATIENT OUTREACH (OUTPATIENT)
Dept: CASE MANAGEMENT | Age: 81
End: 2021-01-05

## 2021-01-05 NOTE — PROGRESS NOTES
I (Healthcare ), attempted to contact patient to engage in our monthly assessment call. Patient did not answer the call. I (Healthcare ), left detailed message including my contact information so that patient could return my phone call.  I (Health

## 2021-01-06 ENCOUNTER — PATIENT OUTREACH (OUTPATIENT)
Dept: CASE MANAGEMENT | Age: 81
End: 2021-01-06

## 2021-01-06 DIAGNOSIS — E78.2 MIXED HYPERLIPIDEMIA: ICD-10-CM

## 2021-01-06 DIAGNOSIS — F02.80 LATE ONSET ALZHEIMER'S DISEASE WITHOUT BEHAVIORAL DISTURBANCE (HCC): ICD-10-CM

## 2021-01-06 DIAGNOSIS — K75.81 NASH (NONALCOHOLIC STEATOHEPATITIS): ICD-10-CM

## 2021-01-06 DIAGNOSIS — E55.9 VITAMIN D DEFICIENCY: ICD-10-CM

## 2021-01-06 DIAGNOSIS — E11.22 TYPE 2 DIABETES MELLITUS WITH STAGE 2 CHRONIC KIDNEY DISEASE, WITHOUT LONG-TERM CURRENT USE OF INSULIN (HCC): ICD-10-CM

## 2021-01-06 DIAGNOSIS — M85.89 OSTEOPENIA OF MULTIPLE SITES: ICD-10-CM

## 2021-01-06 DIAGNOSIS — N32.81 OAB (OVERACTIVE BLADDER): ICD-10-CM

## 2021-01-06 DIAGNOSIS — G30.1 LATE ONSET ALZHEIMER'S DISEASE WITHOUT BEHAVIORAL DISTURBANCE (HCC): ICD-10-CM

## 2021-01-06 DIAGNOSIS — N18.2 TYPE 2 DIABETES MELLITUS WITH STAGE 2 CHRONIC KIDNEY DISEASE, WITHOUT LONG-TERM CURRENT USE OF INSULIN (HCC): ICD-10-CM

## 2021-01-06 DIAGNOSIS — R77.8 TROPONIN LEVEL ELEVATED: ICD-10-CM

## 2021-01-06 NOTE — PROGRESS NOTES
1/6/2021  Spoke to Sushil Conte for CCM. Updates To Patient Care Team/ Comments:   *Up To Date. Patient Reported Changes In Medications:   *None At This Time. Med Adherence Comment:   *Taking As Directed.     Health Maintenance:  Zoster Vaccines(2 of 2 VISIT THE PATIENT. Patient Agrees To Goal Action Plan:  *YES. Self-Management Abilities (Patient Reported):  1= Least Confident In Achieving Goal, 5= Very Confident   Confidence: : 2    Care Manager Follow Up:   *1 Month.      Reason For Follow

## 2021-01-20 DIAGNOSIS — N18.2 TYPE 2 DIABETES MELLITUS WITH STAGE 2 CHRONIC KIDNEY DISEASE, WITHOUT LONG-TERM CURRENT USE OF INSULIN (HCC): ICD-10-CM

## 2021-01-20 DIAGNOSIS — E11.22 TYPE 2 DIABETES MELLITUS WITH STAGE 2 CHRONIC KIDNEY DISEASE, WITHOUT LONG-TERM CURRENT USE OF INSULIN (HCC): ICD-10-CM

## 2021-01-20 NOTE — TELEPHONE ENCOUNTER
Requested Prescriptions     Name from pharmacy: GLIPIZIDE ER 2.5 MG Tablet Extended Release 24 Hour         Will file in chart as: GLIPIZIDE ER 2.5 MG Oral Tablet 24 Hr    Sig: TAKE 1 TABLET DAILY WITH BREAKFAST.     Disp:  90 tablet    Refills:  0 (Pharmac Last HgBA1C < 7.5     Microalbumin procedure in past 12 months or taking ACE/ARB     Appointment in past 6 or next 3 months        Name from pharmacy: [facility]         Will file in chart as: TRUE METRIX BLOO

## 2021-01-21 RX ORDER — ATORVASTATIN CALCIUM 20 MG/1
TABLET, FILM COATED ORAL
Qty: 90 TABLET | Refills: 1 | Status: SHIPPED | OUTPATIENT
Start: 2021-01-21 | End: 2021-07-01

## 2021-01-21 RX ORDER — GLIPIZIDE 2.5 MG/1
TABLET, EXTENDED RELEASE ORAL
Qty: 90 TABLET | Refills: 1 | Status: SHIPPED | OUTPATIENT
Start: 2021-01-21 | End: 2021-07-01

## 2021-01-21 RX ORDER — CALCIUM CITRATE/VITAMIN D3 200MG-6.25
TABLET ORAL
Qty: 200 STRIP | Refills: 1 | Status: SHIPPED | OUTPATIENT
Start: 2021-01-21

## 2021-01-31 PROCEDURE — 99490 CHRNC CARE MGMT STAFF 1ST 20: CPT

## 2021-02-03 ENCOUNTER — PATIENT OUTREACH (OUTPATIENT)
Dept: CASE MANAGEMENT | Age: 81
End: 2021-02-03

## 2021-02-03 DIAGNOSIS — Z23 NEED FOR VACCINATION: ICD-10-CM

## 2021-02-03 NOTE — PROGRESS NOTES
2/3/2021  Spoke to Estella De Anda for CCM. Updates To Patient Care Team/ Comments:   *Up To Date. Patient Reported Changes In Medications:   *None At This Time. Med Adherence Comment:   *Taking As Directed.     Health Maintenance:  Zoster Vaccines(2 of 2 (Patient Reported):  1= Least Confident In Achieving Goal, 5= Very Confident   Confidence: : 1    Care Manager Follow Up:   *1 Month. Reason For Follow Up:   *Review medications, care team, health maintenance and if any changes update patient's chart.

## 2021-02-22 ENCOUNTER — PATIENT OUTREACH (OUTPATIENT)
Dept: CASE MANAGEMENT | Age: 81
End: 2021-02-22

## 2021-02-22 NOTE — PROGRESS NOTES
I attempted to reach out to patient again this month to check in to see how things are going but the phone call did not go through and was unable to leave a message. I will reach back out in a few days.      Reviewed Chart    Time Spent This Encounter: 2 Mi

## 2021-02-23 ENCOUNTER — PATIENT OUTREACH (OUTPATIENT)
Dept: CASE MANAGEMENT | Age: 81
End: 2021-02-23

## 2021-02-23 DIAGNOSIS — M85.89 OSTEOPENIA OF MULTIPLE SITES: ICD-10-CM

## 2021-02-23 DIAGNOSIS — E11.22 TYPE 2 DIABETES MELLITUS WITH STAGE 2 CHRONIC KIDNEY DISEASE, WITHOUT LONG-TERM CURRENT USE OF INSULIN (HCC): ICD-10-CM

## 2021-02-23 DIAGNOSIS — N32.81 OAB (OVERACTIVE BLADDER): ICD-10-CM

## 2021-02-23 DIAGNOSIS — N18.2 TYPE 2 DIABETES MELLITUS WITH STAGE 2 CHRONIC KIDNEY DISEASE, WITHOUT LONG-TERM CURRENT USE OF INSULIN (HCC): ICD-10-CM

## 2021-02-23 DIAGNOSIS — G30.1 LATE ONSET ALZHEIMER'S DISEASE WITHOUT BEHAVIORAL DISTURBANCE (HCC): ICD-10-CM

## 2021-02-23 DIAGNOSIS — E55.9 VITAMIN D DEFICIENCY: ICD-10-CM

## 2021-02-23 DIAGNOSIS — E78.2 MIXED HYPERLIPIDEMIA: ICD-10-CM

## 2021-02-23 DIAGNOSIS — F02.80 LATE ONSET ALZHEIMER'S DISEASE WITHOUT BEHAVIORAL DISTURBANCE (HCC): ICD-10-CM

## 2021-02-23 NOTE — PROGRESS NOTES
I contacted patient to reach out to them in regards to our monthly call. I spoke with the patient's daughter. Patient still has a hard time using her phone.  The family had bought her new phone that was much easier to see and use but she still has a hard ti

## 2021-02-28 PROCEDURE — 99490 CHRNC CARE MGMT STAFF 1ST 20: CPT

## 2021-03-09 ENCOUNTER — PATIENT OUTREACH (OUTPATIENT)
Dept: CASE MANAGEMENT | Age: 81
End: 2021-03-09

## 2021-03-09 DIAGNOSIS — R77.8 ELEVATED TROPONIN: ICD-10-CM

## 2021-03-09 DIAGNOSIS — R77.8 TROPONIN LEVEL ELEVATED: ICD-10-CM

## 2021-03-09 DIAGNOSIS — N18.2 TYPE 2 DIABETES MELLITUS WITH STAGE 2 CHRONIC KIDNEY DISEASE, WITHOUT LONG-TERM CURRENT USE OF INSULIN (HCC): ICD-10-CM

## 2021-03-09 DIAGNOSIS — E78.2 MIXED HYPERLIPIDEMIA: ICD-10-CM

## 2021-03-09 DIAGNOSIS — F02.80 LATE ONSET ALZHEIMER'S DISEASE WITHOUT BEHAVIORAL DISTURBANCE (HCC): ICD-10-CM

## 2021-03-09 DIAGNOSIS — E55.9 VITAMIN D DEFICIENCY: ICD-10-CM

## 2021-03-09 DIAGNOSIS — M85.89 OSTEOPENIA OF MULTIPLE SITES: ICD-10-CM

## 2021-03-09 DIAGNOSIS — N32.81 OAB (OVERACTIVE BLADDER): ICD-10-CM

## 2021-03-09 DIAGNOSIS — E11.22 TYPE 2 DIABETES MELLITUS WITH STAGE 2 CHRONIC KIDNEY DISEASE, WITHOUT LONG-TERM CURRENT USE OF INSULIN (HCC): ICD-10-CM

## 2021-03-09 DIAGNOSIS — G30.1 LATE ONSET ALZHEIMER'S DISEASE WITHOUT BEHAVIORAL DISTURBANCE (HCC): ICD-10-CM

## 2021-03-09 NOTE — PROGRESS NOTES
3/9/2021  Spoke to Agnieszka Elise for CCM. Updates To Patient Care Team/ Comments:   *Up To Date. Patient Reported Changes In Medications:   *None At This Time. Med Adherence Comment:   *Taking As Directed.     Health Maintenance:  Zoster Vaccines(2 of 2 that under control now and the facility is getting back to normal with allowing visitors and the patient's are allowed to go back down to the dining room to eat as long as there is social distancing. *Patient is still having trouble operating her phone. she has visitors coming at certain times and to help her be ready for her visitors so she can have longer time with them. Patient Agrees To Goal Action Plan:  *Yes.          Self-Management Abilities (Patient Reported)   1= Least Confident In Achieving

## 2021-03-30 ENCOUNTER — OFFICE VISIT (OUTPATIENT)
Dept: FAMILY MEDICINE CLINIC | Facility: CLINIC | Age: 81
End: 2021-03-30
Payer: MEDICARE

## 2021-03-30 ENCOUNTER — LAB ENCOUNTER (OUTPATIENT)
Dept: LAB | Age: 81
End: 2021-03-30
Attending: FAMILY MEDICINE
Payer: MEDICARE

## 2021-03-30 VITALS
TEMPERATURE: 97 F | BODY MASS INDEX: 23.59 KG/M2 | RESPIRATION RATE: 16 BRPM | HEIGHT: 63 IN | SYSTOLIC BLOOD PRESSURE: 130 MMHG | WEIGHT: 133.13 LBS | OXYGEN SATURATION: 98 % | HEART RATE: 74 BPM | DIASTOLIC BLOOD PRESSURE: 70 MMHG

## 2021-03-30 DIAGNOSIS — Z23 NEED FOR VACCINATION: ICD-10-CM

## 2021-03-30 DIAGNOSIS — E78.2 MIXED HYPERLIPIDEMIA: ICD-10-CM

## 2021-03-30 DIAGNOSIS — E11.22 TYPE 2 DIABETES MELLITUS WITH STAGE 2 CHRONIC KIDNEY DISEASE, WITHOUT LONG-TERM CURRENT USE OF INSULIN (HCC): Primary | ICD-10-CM

## 2021-03-30 DIAGNOSIS — E11.22 TYPE 2 DIABETES MELLITUS WITH STAGE 2 CHRONIC KIDNEY DISEASE, WITHOUT LONG-TERM CURRENT USE OF INSULIN (HCC): ICD-10-CM

## 2021-03-30 DIAGNOSIS — N18.2 TYPE 2 DIABETES MELLITUS WITH STAGE 2 CHRONIC KIDNEY DISEASE, WITHOUT LONG-TERM CURRENT USE OF INSULIN (HCC): ICD-10-CM

## 2021-03-30 DIAGNOSIS — N18.2 TYPE 2 DIABETES MELLITUS WITH STAGE 2 CHRONIC KIDNEY DISEASE, WITHOUT LONG-TERM CURRENT USE OF INSULIN (HCC): Primary | ICD-10-CM

## 2021-03-30 LAB — HEMOGLOBIN A1C: 6.5 % (ref 4.3–5.6)

## 2021-03-30 PROCEDURE — 80061 LIPID PANEL: CPT

## 2021-03-30 PROCEDURE — 99213 OFFICE O/P EST LOW 20 MIN: CPT | Performed by: FAMILY MEDICINE

## 2021-03-30 PROCEDURE — 36415 COLL VENOUS BLD VENIPUNCTURE: CPT

## 2021-03-30 PROCEDURE — 90471 IMMUNIZATION ADMIN: CPT | Performed by: FAMILY MEDICINE

## 2021-03-30 PROCEDURE — 3075F SYST BP GE 130 - 139MM HG: CPT | Performed by: FAMILY MEDICINE

## 2021-03-30 PROCEDURE — 90750 HZV VACC RECOMBINANT IM: CPT | Performed by: FAMILY MEDICINE

## 2021-03-30 PROCEDURE — 80053 COMPREHEN METABOLIC PANEL: CPT

## 2021-03-30 PROCEDURE — 3008F BODY MASS INDEX DOCD: CPT | Performed by: FAMILY MEDICINE

## 2021-03-30 PROCEDURE — 3078F DIAST BP <80 MM HG: CPT | Performed by: FAMILY MEDICINE

## 2021-03-30 NOTE — PROGRESS NOTES
HPI:   Vernestine Blizzard is a [de-identified]year old female.     Patient presents with:  Diabetes: needs DM eye exam, due for A1C  Immunization/Injection: SHINGRIX    Current meds: glipizide, metformin  Home glucose readings: 120-140s fasting  Hypoglycemic episodes: no  Per (TWO) TIMES DAILY WITH MEALS., Disp: 180 tablet, Rfl: 1  •  TRUE METRIX BLOOD GLUCOSE TEST In Vitro Strip, TEST BLOOD SUGAR TWICE DAILY, Disp: 200 strip, Rfl: 1  •  Memantine HCl 10 MG Oral Tab, Take 1 tablet (10 mg total) by mouth 2 (two) times daily. Carla Stallings long-term current use of insulin (HCC)  - POCT HEMOGLOBIN A1C  - LIPID PANEL; Future  - COMP METABOLIC PANEL (14); Future  - continue current meds, a1c stable  - reminded pt to complete eye exam    2. Mixed hyperlipidemia  - LIPID PANEL;  Future  - stable o

## 2021-03-31 ENCOUNTER — MOBILE ENCOUNTER (OUTPATIENT)
Dept: FAMILY MEDICINE CLINIC | Facility: CLINIC | Age: 81
End: 2021-03-31

## 2021-03-31 ENCOUNTER — OFFICE VISIT (OUTPATIENT)
Dept: FAMILY MEDICINE CLINIC | Facility: CLINIC | Age: 81
End: 2021-03-31
Payer: MEDICARE

## 2021-03-31 ENCOUNTER — HOSPITAL ENCOUNTER (OUTPATIENT)
Dept: CT IMAGING | Facility: HOSPITAL | Age: 81
Discharge: HOME | End: 2021-03-31
Attending: FAMILY MEDICINE
Payer: MEDICARE

## 2021-03-31 VITALS
DIASTOLIC BLOOD PRESSURE: 80 MMHG | OXYGEN SATURATION: 91 % | RESPIRATION RATE: 18 BRPM | HEART RATE: 91 BPM | SYSTOLIC BLOOD PRESSURE: 130 MMHG | TEMPERATURE: 98 F

## 2021-03-31 DIAGNOSIS — R32 URINARY INCONTINENCE, UNSPECIFIED TYPE: ICD-10-CM

## 2021-03-31 DIAGNOSIS — R41.82 ALTERED MENTAL STATUS, UNSPECIFIED ALTERED MENTAL STATUS TYPE: Primary | ICD-10-CM

## 2021-03-31 DIAGNOSIS — R41.82 ALTERED MENTAL STATUS, UNSPECIFIED ALTERED MENTAL STATUS TYPE: ICD-10-CM

## 2021-03-31 LAB
APPEARANCE: CLEAR
BILIRUBIN: NEGATIVE
GLUCOSE (URINE DIPSTICK): NEGATIVE MG/DL
KETONES (URINE DIPSTICK): 15 MG/DL
LEUKOCYTES: NEGATIVE
MULTISTIX LOT#: 5077 NUMERIC
NITRITE, URINE: NEGATIVE
OCCULT BLOOD: NEGATIVE
PH, URINE: 8 (ref 4.5–8)
POC GLUCOSE: 161
PROTEIN (URINE DIPSTICK): NEGATIVE MG/DL
SPECIFIC GRAVITY: 1.02 (ref 1–1.03)
URINE-COLOR: YELLOW
UROBILINOGEN,SEMI-QN: 1 MG/DL (ref 0–1.9)

## 2021-03-31 PROCEDURE — 81002 URINALYSIS NONAUTO W/O SCOPE: CPT | Performed by: FAMILY MEDICINE

## 2021-03-31 PROCEDURE — 87086 URINE CULTURE/COLONY COUNT: CPT | Performed by: FAMILY MEDICINE

## 2021-03-31 PROCEDURE — 3079F DIAST BP 80-89 MM HG: CPT | Performed by: FAMILY MEDICINE

## 2021-03-31 PROCEDURE — 99490 CHRNC CARE MGMT STAFF 1ST 20: CPT

## 2021-03-31 PROCEDURE — 3075F SYST BP GE 130 - 139MM HG: CPT | Performed by: FAMILY MEDICINE

## 2021-03-31 PROCEDURE — 99214 OFFICE O/P EST MOD 30 MIN: CPT | Performed by: FAMILY MEDICINE

## 2021-03-31 PROCEDURE — 70450 CT HEAD/BRAIN W/O DYE: CPT | Performed by: FAMILY MEDICINE

## 2021-03-31 RX ORDER — NITROFURANTOIN 25; 75 MG/1; MG/1
100 CAPSULE ORAL 2 TIMES DAILY
Qty: 14 CAPSULE | Refills: 0 | Status: SHIPPED | OUTPATIENT
Start: 2021-03-31 | End: 2021-04-07

## 2021-03-31 NOTE — PROGRESS NOTES
HPI:   Diana Schmidt is a [de-identified]year old female. Patient presents with:  Confusion: hard for her to stand, unable to speak - started today at around noon    Daughter reports patient had two episodes of full urinary incontinence today.   Also she is much less Oral Tab, TAKE 1 TABLET EVERY NIGHT, Disp: 90 tablet, Rfl: 1  •  METFORMIN HCL 1000 MG Oral Tab, TAKE 1 TABLET (1,000 MG TOTAL) BY MOUTH 2 (TWO) TIMES DAILY WITH MEALS., Disp: 180 tablet, Rfl: 1  •  TRUE METRIX BLOOD GLUCOSE TEST In Vitro Strip, TEST BLOOD gallops. LUNGS: Clear to auscultation bilterally, no rales/rhonchi/wheezing. ABDOMEN:  Soft, nondistended, nontender, bowel sounds normal in all 4 quadrants, no masses, no hepatosplenomegaly. No CVA or suprapubic tenderness.    EXTREMITIES:  No edema, no

## 2021-04-01 ENCOUNTER — TELEPHONE (OUTPATIENT)
Dept: FAMILY MEDICINE CLINIC | Facility: CLINIC | Age: 81
End: 2021-04-01

## 2021-04-01 NOTE — PROGRESS NOTES
Notified by radiology of stat CT results. There were no acute findings noted. There is chronic small vessel ischemic disease. Patient left the hospital stable.   I have called patient and left her a voice message informing her that CT scan did not have a

## 2021-04-01 NOTE — TELEPHONE ENCOUNTER
CC'd chart message:    Addendum      Notified by radiology of stat CT results. There were no acute findings noted. There is chronic small vessel ischemic disease. Patient left the hospital stable.   I have called patient and left her a voice message info

## 2021-04-01 NOTE — TELEPHONE ENCOUNTER
LM for daughter Gilma notifying of information below and providing results and recommendations again. Asked Gilma to call the office with any questions, call back number provided.

## 2021-04-01 NOTE — TELEPHONE ENCOUNTER
Daughter called back and confirmed she received the message with pt's CT results.  [place] states she will call pt's living facility to see how pt is doing today, reiterated recommendations for follow up with PCP or ER if symptoms are progressive, verbalizes

## 2021-04-07 ENCOUNTER — PATIENT OUTREACH (OUTPATIENT)
Dept: CASE MANAGEMENT | Age: 81
End: 2021-04-07

## 2021-04-07 NOTE — PROGRESS NOTES
I attempted to contact patient to engage in our monthly telephone encounter the patient either was unavailable at this time or did not answer. I left a detailed message including my contact information for the patient to return my call.  If I do not hear fr

## 2021-04-13 ENCOUNTER — PATIENT OUTREACH (OUTPATIENT)
Dept: CASE MANAGEMENT | Age: 81
End: 2021-04-13

## 2021-04-13 DIAGNOSIS — E78.2 MIXED HYPERLIPIDEMIA: ICD-10-CM

## 2021-04-13 DIAGNOSIS — N18.2 TYPE 2 DIABETES MELLITUS WITH STAGE 2 CHRONIC KIDNEY DISEASE, WITHOUT LONG-TERM CURRENT USE OF INSULIN (HCC): ICD-10-CM

## 2021-04-13 DIAGNOSIS — N32.81 OAB (OVERACTIVE BLADDER): ICD-10-CM

## 2021-04-13 DIAGNOSIS — F02.80 LATE ONSET ALZHEIMER'S DISEASE WITHOUT BEHAVIORAL DISTURBANCE (HCC): ICD-10-CM

## 2021-04-13 DIAGNOSIS — E11.22 TYPE 2 DIABETES MELLITUS WITH STAGE 2 CHRONIC KIDNEY DISEASE, WITHOUT LONG-TERM CURRENT USE OF INSULIN (HCC): ICD-10-CM

## 2021-04-13 DIAGNOSIS — G30.1 LATE ONSET ALZHEIMER'S DISEASE WITHOUT BEHAVIORAL DISTURBANCE (HCC): ICD-10-CM

## 2021-04-13 DIAGNOSIS — E55.9 VITAMIN D DEFICIENCY: ICD-10-CM

## 2021-04-13 NOTE — PROGRESS NOTES
4/13/2021  Spoke to Dora Clifford for CCM. Updates To Patient Care Team/ Comments:   *Up To Date. Patient Reported Changes In Medications:   *Up To Date. Med Adherence Comment:   *Taking As Directed.     Health Maintenance:  Diabetes Care Dilated Eye Ex become more forgetful and just recently had an episode of a altered state of catatonic behavior to where she was just staring and would only answer to two phases.  The next day she doesn't remember at thing and was right back to normal.   Patient Reported N my duty! Time Spent This Encounter Total:    14 MINUTES(s)       Medical Record Review. Telephone Communication. Care Plan Updated. Education. Goals & Action Plan Recreation/Update. Provided Acknowledgment & Validation To Patient's Concerns.

## 2021-04-28 RX ORDER — OXYBUTYNIN CHLORIDE 5 MG/1
TABLET ORAL
Qty: 360 TABLET | Refills: 1 | OUTPATIENT
Start: 2021-04-28

## 2021-04-28 NOTE — TELEPHONE ENCOUNTER
Requested Prescriptions     Name from pharmacy: OXYBUTYNIN CHLORIDE 5 MG Tablet         Will file in chart as: OXYBUTYNIN CHLORIDE 5 MG Oral Tab    Sig: TAKE 1 TABLET FOUR TIMES DAILY    Disp:  360 tablet    Refills:  1 (Pharmacy requested: Not specified)

## 2021-04-30 PROCEDURE — 99490 CHRNC CARE MGMT STAFF 1ST 20: CPT

## 2021-05-07 ENCOUNTER — PATIENT OUTREACH (OUTPATIENT)
Dept: CASE MANAGEMENT | Age: 81
End: 2021-05-07

## 2021-05-07 DIAGNOSIS — F02.80 LATE ONSET ALZHEIMER'S DISEASE WITHOUT BEHAVIORAL DISTURBANCE (HCC): ICD-10-CM

## 2021-05-07 DIAGNOSIS — E55.9 VITAMIN D DEFICIENCY: ICD-10-CM

## 2021-05-07 DIAGNOSIS — E11.22 TYPE 2 DIABETES MELLITUS WITH STAGE 2 CHRONIC KIDNEY DISEASE, WITHOUT LONG-TERM CURRENT USE OF INSULIN (HCC): ICD-10-CM

## 2021-05-07 DIAGNOSIS — N18.2 TYPE 2 DIABETES MELLITUS WITH STAGE 2 CHRONIC KIDNEY DISEASE, WITHOUT LONG-TERM CURRENT USE OF INSULIN (HCC): ICD-10-CM

## 2021-05-07 DIAGNOSIS — E78.2 MIXED HYPERLIPIDEMIA: ICD-10-CM

## 2021-05-07 DIAGNOSIS — N32.81 OAB (OVERACTIVE BLADDER): ICD-10-CM

## 2021-05-07 DIAGNOSIS — G30.1 LATE ONSET ALZHEIMER'S DISEASE WITHOUT BEHAVIORAL DISTURBANCE (HCC): ICD-10-CM

## 2021-05-07 DIAGNOSIS — M85.89 OSTEOPENIA OF MULTIPLE SITES: ICD-10-CM

## 2021-05-07 NOTE — PROGRESS NOTES
5/7/2021  Spoke to Estella De Anda for CCM. Updates To Patient Care Team/ Comments:   *Up To Date. Patient Reported Changes In Medications:   *Up To Date. Med Adherence Comment:   *Taking As Directed.     Health Maintenance:  Diabetes Care Dilated Eye Exa From Previous Outreach:   *Incorporating arm exercises and stretching in daily activities.      Patient Reported Progress Toward Goal:   *Patient has not really been engaging to much in her exercises other than daily movement because she dealing with the on moving forward and being successful in their present and future goals. Helping them execute their goals and have that feeling of success is my duty! Time Spent This Encounter Total:    20 MINUTES(s)       Medical Record Review.   Telephone Communicati

## 2021-05-17 ENCOUNTER — OFFICE VISIT (OUTPATIENT)
Dept: FAMILY MEDICINE CLINIC | Facility: CLINIC | Age: 81
End: 2021-05-17
Payer: MEDICARE

## 2021-05-17 VITALS
RESPIRATION RATE: 16 BRPM | HEART RATE: 65 BPM | HEIGHT: 63 IN | DIASTOLIC BLOOD PRESSURE: 60 MMHG | TEMPERATURE: 97 F | SYSTOLIC BLOOD PRESSURE: 118 MMHG | WEIGHT: 134.5 LBS | OXYGEN SATURATION: 98 % | BODY MASS INDEX: 23.83 KG/M2

## 2021-05-17 DIAGNOSIS — E55.9 VITAMIN D DEFICIENCY: ICD-10-CM

## 2021-05-17 DIAGNOSIS — E11.22 TYPE 2 DIABETES MELLITUS WITH STAGE 2 CHRONIC KIDNEY DISEASE, WITHOUT LONG-TERM CURRENT USE OF INSULIN (HCC): ICD-10-CM

## 2021-05-17 DIAGNOSIS — M85.89 OSTEOPENIA OF MULTIPLE SITES: ICD-10-CM

## 2021-05-17 DIAGNOSIS — E78.2 MIXED HYPERLIPIDEMIA: ICD-10-CM

## 2021-05-17 DIAGNOSIS — K75.81 NASH (NONALCOHOLIC STEATOHEPATITIS): ICD-10-CM

## 2021-05-17 DIAGNOSIS — N32.81 OAB (OVERACTIVE BLADDER): ICD-10-CM

## 2021-05-17 DIAGNOSIS — Z00.00 ENCOUNTER FOR ANNUAL HEALTH EXAMINATION: Primary | ICD-10-CM

## 2021-05-17 DIAGNOSIS — N18.2 TYPE 2 DIABETES MELLITUS WITH STAGE 2 CHRONIC KIDNEY DISEASE, WITHOUT LONG-TERM CURRENT USE OF INSULIN (HCC): ICD-10-CM

## 2021-05-17 DIAGNOSIS — G30.1 LATE ONSET ALZHEIMER'S DISEASE WITHOUT BEHAVIORAL DISTURBANCE (HCC): ICD-10-CM

## 2021-05-17 DIAGNOSIS — F02.80 LATE ONSET ALZHEIMER'S DISEASE WITHOUT BEHAVIORAL DISTURBANCE (HCC): ICD-10-CM

## 2021-05-17 PROCEDURE — 96160 PT-FOCUSED HLTH RISK ASSMT: CPT | Performed by: FAMILY MEDICINE

## 2021-05-17 PROCEDURE — G0439 PPPS, SUBSEQ VISIT: HCPCS | Performed by: FAMILY MEDICINE

## 2021-05-17 PROCEDURE — 3078F DIAST BP <80 MM HG: CPT | Performed by: FAMILY MEDICINE

## 2021-05-17 PROCEDURE — 3074F SYST BP LT 130 MM HG: CPT | Performed by: FAMILY MEDICINE

## 2021-05-17 PROCEDURE — 3008F BODY MASS INDEX DOCD: CPT | Performed by: FAMILY MEDICINE

## 2021-05-17 PROCEDURE — 99397 PER PM REEVAL EST PAT 65+ YR: CPT | Performed by: FAMILY MEDICINE

## 2021-05-17 NOTE — PATIENT INSTRUCTIONS
Eugenie Casiano's SCREENING SCHEDULE   Tests on this list are recommended by your physician but may not be covered, or covered at this frequency, by your insurer. Please check with your insurance carrier before scheduling to verify coverage.    PREVENTATIVE S one of the following criteria:   • Men who are 73-68 years old and have smoked more than 100 cigarettes in their lifetime   • Anyone with a family history    Colorectal Cancer Screening  Covered up to Age 76     Colonoscopy Screen   Covered every 10 years- reminders to display for this patient.  Please get this Mammogram regularly   Immunizations      Influenza  Covered Annually Orders placed or performed in visit on 09/17/20   • FLU VACC HIGH DOSE PRSV FREE   Orders placed or performed in visit on 09/10/19 Medical Society website. [email or web address]/[email or web address]  A link to the [employer]. This site has a lot of good information including definitions of the different types of Advance Directives.  It also has the [facility]

## 2021-05-17 NOTE — PROGRESS NOTES
HPI:   Maddie Boyer is a [de-identified]year old female who presents for a MA (Medicare Advantage) [facility] (Once per calendar year). Slowly progressing dementia. Lives in [place] at [facility]. Staff helps with meds. Daughter lives nearby.   Diabetic diet, walks f \"Tree\": Incorrect       Functional Ability/Status   Mala Rey has some abnormal functions as listed below:  She has Driving difficulties based on screening of functional status.    Driving: Cannot do without help   She has difficulties Managing Money/Bi OAB (overactive bladder)     Osteopenia of multiple sites     PORTILLO (nonalcoholic steatohepatitis)     Late onset [facility]'s disease without behavioral disturbance ([facility]    Wt Readings from Last 3 Encounters:  05/17/21 : 134 lb 8 oz (61 kg)  03/30/21 : 133 Dementia ([facility] Hyperlipidemia (6/29/2012), Late onset Alzheimer's disease without behavioral disturbance ([facility] (2/25/2018), PORTILLO (nonalcoholic steatohepatitis) (4/5/2017), Osteopenia (2/19/2014), S/P left breast biopsy (5/17/2017), Type 2 diabetes mellitu 134 lb 8 oz (61 kg)   SpO2 98%   BMI 23.83 kg/m²  Estimated body mass index is 23.83 kg/m² as calculated from the following:    Height as of this encounter: 5' 3\" (1.6 m). Weight as of this encounter: 134 lb 8 oz (61 kg).     Medicare Hearing Assessment 02/13/2017   • Influenza 10/16/2013, 09/27/2018   • Pneumococcal (Prevnar 13) 10/06/2016   • Pneumovax 23 12/04/2017   • Zoster Vaccine Recombinant Adjuvanted (Shingrix) 03/05/2018, 03/30/2021        ASSESSMENT AND OTHER RELEVANT CHRONIC CONDITIONS:   Rekha Muniz Screening      HbgA1C   Annually Lab Results   Component Value Date    A1C 6.5 (A) 03/30/2021    No flowsheet data found.     Fasting Blood Sugar (FSB)Annually Glucose (mg/dL)   Date Value   03/30/2021 108 (H)     GLUCOSE (mg/dL)   Date Value   04/01/2015 1 get once after your 65th birthday    Pneumococcal 23 (Pneumovax)  Covered Once after 65 12/04/2017 Please get once after your 65th birthday    Hepatitis B for Moderate/High Risk No vaccine history found Medium/high risk factors:   End-stage renal disease

## 2021-05-31 PROBLEM — R07.9 ACUTE CHEST PAIN: Status: RESOLVED | Noted: 2020-09-30 | Resolved: 2021-05-31

## 2021-05-31 PROBLEM — R77.8 ELEVATED TROPONIN: Status: RESOLVED | Noted: 2020-09-30 | Resolved: 2021-05-31

## 2021-05-31 PROBLEM — R79.89 ELEVATED TROPONIN: Status: RESOLVED | Noted: 2020-09-30 | Resolved: 2021-05-31

## 2021-05-31 PROCEDURE — 99490 CHRNC CARE MGMT STAFF 1ST 20: CPT

## 2021-06-08 ENCOUNTER — PATIENT OUTREACH (OUTPATIENT)
Dept: CASE MANAGEMENT | Age: 81
End: 2021-06-08

## 2021-06-08 NOTE — PROGRESS NOTES
I attempted to contact patient to return their phone call  The patient was either unavailable at this time or did not answer. I left a detailed message including my contact information for the patient to return my call.  If I do not hear from patient in the

## 2021-06-14 ENCOUNTER — PATIENT OUTREACH (OUTPATIENT)
Dept: CASE MANAGEMENT | Age: 81
End: 2021-06-14

## 2021-06-14 ENCOUNTER — TELEPHONE (OUTPATIENT)
Dept: CASE MANAGEMENT | Age: 81
End: 2021-06-14

## 2021-06-14 DIAGNOSIS — N32.81 OAB (OVERACTIVE BLADDER): ICD-10-CM

## 2021-06-14 DIAGNOSIS — E78.2 MIXED HYPERLIPIDEMIA: ICD-10-CM

## 2021-06-14 DIAGNOSIS — G30.1 LATE ONSET ALZHEIMER'S DISEASE WITHOUT BEHAVIORAL DISTURBANCE (HCC): ICD-10-CM

## 2021-06-14 DIAGNOSIS — E55.9 VITAMIN D DEFICIENCY: ICD-10-CM

## 2021-06-14 DIAGNOSIS — Z12.31 ENCOUNTER FOR SCREENING MAMMOGRAM FOR MALIGNANT NEOPLASM OF BREAST: Primary | ICD-10-CM

## 2021-06-14 DIAGNOSIS — F02.80 LATE ONSET ALZHEIMER'S DISEASE WITHOUT BEHAVIORAL DISTURBANCE (HCC): ICD-10-CM

## 2021-06-14 DIAGNOSIS — E11.22 TYPE 2 DIABETES MELLITUS WITH STAGE 2 CHRONIC KIDNEY DISEASE, WITHOUT LONG-TERM CURRENT USE OF INSULIN (HCC): ICD-10-CM

## 2021-06-14 DIAGNOSIS — N18.2 TYPE 2 DIABETES MELLITUS WITH STAGE 2 CHRONIC KIDNEY DISEASE, WITHOUT LONG-TERM CURRENT USE OF INSULIN (HCC): ICD-10-CM

## 2021-06-14 NOTE — TELEPHONE ENCOUNTER
Good Afternoon,     I just spoke with the daughter Nneka Dominguez) of patient Marlyse Goltz. Nicholas Mcclain ([id number]). Patient's daughter is requesting a referral for a mammogram and for her diabetes care dilated eye exam for her Mother.  Patient's daughter request that t

## 2021-06-14 NOTE — TELEPHONE ENCOUNTER
Mammogram order placed. Referral placed for Dr. Addy Blackburn. Daughter provided with info.  Verbalized understanding

## 2021-06-14 NOTE — PROGRESS NOTES
6/14/2021  Spoke to Cynthia Sue for CCM. Updates To Patient Care Team/ Comments:   *Up To Date. Patient Reported Changes In Medications:   *Up To Date. Med Adherence Comment:   *Taking As Directed.     Health Maintenance:  Diabetes Care Dilated Eye Ex that she might have to go into a dementia facility soon rather than the place she is in. Plan For Overcoming All Barriers:   *Patient's daughter is slowly transitioning for that move if the time does come for that.  Patient's daughter is also cleaning h Khanh Littlejohn DO, need for CCM determined from these assessments and data.

## 2021-06-30 DIAGNOSIS — E11.22 TYPE 2 DIABETES MELLITUS WITH STAGE 2 CHRONIC KIDNEY DISEASE, WITHOUT LONG-TERM CURRENT USE OF INSULIN (HCC): ICD-10-CM

## 2021-06-30 DIAGNOSIS — N18.2 TYPE 2 DIABETES MELLITUS WITH STAGE 2 CHRONIC KIDNEY DISEASE, WITHOUT LONG-TERM CURRENT USE OF INSULIN (HCC): ICD-10-CM

## 2021-06-30 PROCEDURE — 99490 CHRNC CARE MGMT STAFF 1ST 20: CPT

## 2021-07-01 RX ORDER — ATORVASTATIN CALCIUM 20 MG/1
TABLET, FILM COATED ORAL
Qty: 90 TABLET | Refills: 1 | Status: SHIPPED | OUTPATIENT
Start: 2021-07-01

## 2021-07-01 RX ORDER — GLIPIZIDE 2.5 MG/1
TABLET, EXTENDED RELEASE ORAL
Qty: 90 TABLET | Refills: 1 | Status: SHIPPED | OUTPATIENT
Start: 2021-07-01

## 2021-07-01 NOTE — TELEPHONE ENCOUNTER
Requested Prescriptions     Name from pharmacy: [address] [address],[address]         Will file in chart as: METFORMIN HCL 1000 MG Oral Tab    Sig: TAKE 1 TABLET TWICE DAILY WITH MEALS    Disp:  180 tablet    Refills:  1 (Pharmacy requested: Not s months or taking ACE/ARB     Appointment in past 6 or next 3 months            Last refills given on 1/21/21 for 6 month supply.   LOV: 5/17/21  RTC: 6 months    RX sent as it passed protocol

## 2021-07-05 ENCOUNTER — PATIENT OUTREACH (OUTPATIENT)
Dept: CASE MANAGEMENT | Age: 81
End: 2021-07-05

## 2021-07-05 ENCOUNTER — TELEPHONE (OUTPATIENT)
Dept: CASE MANAGEMENT | Age: 81
End: 2021-07-05

## 2021-07-05 DIAGNOSIS — E11.22 TYPE 2 DIABETES MELLITUS WITH STAGE 2 CHRONIC KIDNEY DISEASE, WITHOUT LONG-TERM CURRENT USE OF INSULIN (HCC): ICD-10-CM

## 2021-07-05 DIAGNOSIS — G30.1 LATE ONSET ALZHEIMER'S DISEASE WITHOUT BEHAVIORAL DISTURBANCE (HCC): ICD-10-CM

## 2021-07-05 DIAGNOSIS — Z91.81 AT HIGH RISK FOR FALLS: ICD-10-CM

## 2021-07-05 DIAGNOSIS — F02.80 LATE ONSET ALZHEIMER'S DISEASE WITHOUT BEHAVIORAL DISTURBANCE (HCC): ICD-10-CM

## 2021-07-05 DIAGNOSIS — M62.81 QUADRICEPS WEAKNESS: ICD-10-CM

## 2021-07-05 DIAGNOSIS — N18.2 TYPE 2 DIABETES MELLITUS WITH STAGE 2 CHRONIC KIDNEY DISEASE, WITHOUT LONG-TERM CURRENT USE OF INSULIN (HCC): ICD-10-CM

## 2021-07-05 DIAGNOSIS — E78.2 MIXED HYPERLIPIDEMIA: ICD-10-CM

## 2021-07-05 DIAGNOSIS — R26.9 GAIT ABNORMALITY: Primary | ICD-10-CM

## 2021-07-05 NOTE — TELEPHONE ENCOUNTER
Spoke to Bharat OROURKE patients daughter today. Patient can no longer walk on her own its more of a shuffle and constantly having to grab a wall. Requesting a seated walker so patient can use for stability and if fatigued can stop to sit.

## 2021-07-05 NOTE — PROGRESS NOTES
7/5/2021  Spoke to Susan Melchor for CCM. Lorjose maria Cart patients daughter answering questions.       Updates to patient care team/ comments: UTD   Patient reported changes in medications: reports no changes   Med Adherence  Comment: reports adherence     [facility] 1= least confident in achieving goal, 5= very confident               - confidence: 3      Care Manager Follow Up: 1 month. Reason For Follow Up: review progress and or barriers towards patients goals.      Care managers interventions: Continue to prov

## 2021-07-13 ENCOUNTER — TELEPHONE (OUTPATIENT)
Dept: FAMILY MEDICINE CLINIC | Facility: CLINIC | Age: 81
End: 2021-07-13

## 2021-07-13 NOTE — TELEPHONE ENCOUNTER
Received DM eye exam dated 7/12/21 from Dr. Manisha Lamas. Report has been abstracted. Report sent to scan.

## 2021-07-20 NOTE — TELEPHONE ENCOUNTER
Jo,       The referral for the walker has been placed for the patient. A [employer] message was sent asking which vendor they planned to use so we can send the order, message was read on the day it was sent but no answer was given.  Let us know where patie

## 2021-07-29 NOTE — TELEPHONE ENCOUNTER
Signed order faxed to [employer] with facesheet and pt insurance info, [phone], confirmation received. Original sent to scan, copy placed in triage accordion folder.

## 2021-07-31 PROCEDURE — 99490 CHRNC CARE MGMT STAFF 1ST 20: CPT

## 2021-08-04 ENCOUNTER — PATIENT OUTREACH (OUTPATIENT)
Dept: CASE MANAGEMENT | Age: 81
End: 2021-08-04

## 2021-08-04 DIAGNOSIS — E78.2 MIXED HYPERLIPIDEMIA: ICD-10-CM

## 2021-08-04 DIAGNOSIS — N32.81 OAB (OVERACTIVE BLADDER): ICD-10-CM

## 2021-08-04 DIAGNOSIS — F02.80 LATE ONSET ALZHEIMER'S DISEASE WITHOUT BEHAVIORAL DISTURBANCE (HCC): ICD-10-CM

## 2021-08-04 DIAGNOSIS — E11.22 TYPE 2 DIABETES MELLITUS WITH STAGE 2 CHRONIC KIDNEY DISEASE, WITHOUT LONG-TERM CURRENT USE OF INSULIN (HCC): ICD-10-CM

## 2021-08-04 DIAGNOSIS — N18.2 TYPE 2 DIABETES MELLITUS WITH STAGE 2 CHRONIC KIDNEY DISEASE, WITHOUT LONG-TERM CURRENT USE OF INSULIN (HCC): ICD-10-CM

## 2021-08-04 DIAGNOSIS — G30.1 LATE ONSET ALZHEIMER'S DISEASE WITHOUT BEHAVIORAL DISTURBANCE (HCC): ICD-10-CM

## 2021-08-04 NOTE — PROGRESS NOTES
8/4/2021  Spoke to Cynthia Sue for CCM.       Updates to patient care team/ comments: UTD  Patient reported changes in medications: reports no changes   Med Adherence  Comment: reports adherence     Health Maintenance:     Diabetes Care A1C due on 09/30/2021  Inf N/A  • Patient Reported New Barriers And Concerns: N/A                   - Plan for overcoming all barriers: ENCOURAGED PATIENT TO CALL WITH ANY QUESTIONS OR CONCERNS. Patient agrees to goal action plan.   Self-Management Abilities (patient repo

## 2021-08-04 NOTE — PROGRESS NOTES
OLYA verified for CCM monthly outreach. I called patient and left a detailed message to call back. I will follow up with patient at a later time.       Medical record reviewed including recent office visits and test results      Total time spent with p

## 2021-08-17 ENCOUNTER — TELEPHONE (OUTPATIENT)
Dept: FAMILY MEDICINE CLINIC | Facility: CLINIC | Age: 81
End: 2021-08-17

## 2021-08-18 NOTE — TELEPHONE ENCOUNTER
Spoke to daughter of patient. Daughter states patient has been urinating her bed. Would like refill on oxybutynin. Also asked for urologist recommendation. Provided info for Dr. Marjorie Badillo, Dr Irina Justin.      Medication Quantity Refills Start End   OXYBUTYNIN

## 2021-08-19 RX ORDER — OXYBUTYNIN CHLORIDE 5 MG/1
5 TABLET ORAL 4 TIMES DAILY
Qty: 360 TABLET | Refills: 1 | Status: SHIPPED | OUTPATIENT
Start: 2021-08-19 | End: 2021-09-18 | Stop reason: ALTCHOICE

## 2021-08-19 NOTE — TELEPHONE ENCOUNTER
Yes, continue oxybutnin and agree with urology eval if condition is advancing.       Sol Proctor, DO  Family Medicine

## 2021-08-20 ENCOUNTER — TELEPHONE (OUTPATIENT)
Dept: ADMINISTRATIVE | Age: 81
End: 2021-08-20

## 2021-08-20 DIAGNOSIS — R35.0 URINATION FREQUENCY: Primary | ICD-10-CM

## 2021-08-31 PROCEDURE — 99490 CHRNC CARE MGMT STAFF 1ST 20: CPT

## 2021-09-02 ENCOUNTER — PATIENT OUTREACH (OUTPATIENT)
Dept: CASE MANAGEMENT | Age: 81
End: 2021-09-02

## 2021-09-02 NOTE — PROGRESS NOTES
9/2/2021  Spoke to Mary Alice Erazo for CCM.       Updates to patient care team/ comments: UTD   Patient reported changes in medications: reports no changes   Med Adherence  Comment: reports adherence     Health Maintenance:   Diabetes Care A1C due on 09/30/2021  Infl Concerns: n/a                   - Plan for overcoming all barriers:  encouraged patient to call with any questions or concerns. Patient agrees to goal action plan.   Self-Management Abilities (patient reported)             1= least confident in

## 2021-09-03 ENCOUNTER — OFFICE VISIT (OUTPATIENT)
Dept: FAMILY MEDICINE CLINIC | Facility: CLINIC | Age: 81
End: 2021-09-03
Payer: MEDICARE

## 2021-09-03 VITALS
TEMPERATURE: 99 F | HEIGHT: 63 IN | HEART RATE: 76 BPM | SYSTOLIC BLOOD PRESSURE: 118 MMHG | WEIGHT: 125 LBS | OXYGEN SATURATION: 98 % | DIASTOLIC BLOOD PRESSURE: 70 MMHG | BODY MASS INDEX: 22.15 KG/M2 | RESPIRATION RATE: 16 BRPM

## 2021-09-03 DIAGNOSIS — R39.89 URINARY PROBLEM: Primary | ICD-10-CM

## 2021-09-03 NOTE — PROGRESS NOTES
Unique Mckinley is a 80year old female. Patient presented to the office for her appointment. Patient was not examined or seen by me. She was roomed by the MA.  She spent 1 hour on and off  trying to void in the bathroom however she was not able to do so susy

## 2021-10-04 ENCOUNTER — PATIENT OUTREACH (OUTPATIENT)
Dept: CASE MANAGEMENT | Age: 81
End: 2021-10-04

## 2021-10-04 DIAGNOSIS — E11.22 TYPE 2 DIABETES MELLITUS WITH STAGE 2 CHRONIC KIDNEY DISEASE, WITHOUT LONG-TERM CURRENT USE OF INSULIN (HCC): ICD-10-CM

## 2021-10-04 DIAGNOSIS — G30.1 LATE ONSET ALZHEIMER'S DISEASE WITHOUT BEHAVIORAL DISTURBANCE (HCC): ICD-10-CM

## 2021-10-04 DIAGNOSIS — E78.2 MIXED HYPERLIPIDEMIA: ICD-10-CM

## 2021-10-04 DIAGNOSIS — N18.2 TYPE 2 DIABETES MELLITUS WITH STAGE 2 CHRONIC KIDNEY DISEASE, WITHOUT LONG-TERM CURRENT USE OF INSULIN (HCC): ICD-10-CM

## 2021-10-04 DIAGNOSIS — F02.80 LATE ONSET ALZHEIMER'S DISEASE WITHOUT BEHAVIORAL DISTURBANCE (HCC): ICD-10-CM

## 2021-10-04 NOTE — PROGRESS NOTES
10/4/2021  Spoke to Roselia Glynn for CCM.       Updates to patient care team/ comments: UTD  Patient reported changes in medications: reports changes   Med Adherence  Comment: reports adherence     Health Maintenance:   Diabetes Care A1C due on 09/30/2021  Leobardo all around                    - Plan for overcoming all barriers encouraged patient to call with any questions or concerns. Patient agrees to goal action plan.   Self-Management Abilities (patient reported)             1= least confident in achi

## 2021-10-18 ENCOUNTER — TELEPHONE (OUTPATIENT)
Dept: FAMILY MEDICINE CLINIC | Facility: CLINIC | Age: 81
End: 2021-10-18

## 2021-10-18 ENCOUNTER — HOSPITAL ENCOUNTER (OUTPATIENT)
Age: 81
Discharge: HOME | End: 2021-10-18
Payer: MEDICARE

## 2021-10-18 ENCOUNTER — APPOINTMENT (OUTPATIENT)
Dept: GENERAL RADIOLOGY | Age: 81
End: 2021-10-18
Attending: PHYSICIAN ASSISTANT
Payer: MEDICARE

## 2021-10-18 VITALS
HEIGHT: 60 IN | HEART RATE: 81 BPM | DIASTOLIC BLOOD PRESSURE: 77 MMHG | SYSTOLIC BLOOD PRESSURE: 161 MMHG | RESPIRATION RATE: 20 BRPM | OXYGEN SATURATION: 97 % | BODY MASS INDEX: 25.52 KG/M2 | TEMPERATURE: 97 F | WEIGHT: 130 LBS
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
## 2021-10-18 DIAGNOSIS — S30.0XXA CONTUSION OF BUTTOCK, INITIAL ENCOUNTER: ICD-10-CM

## 2021-10-18 PROCEDURE — 73523 X-RAY EXAM HIPS BI 5/> VIEWS: CPT | Performed by: PHYSICIAN ASSISTANT

## 2021-10-18 PROCEDURE — 99203 OFFICE O/P NEW LOW 30 MIN: CPT | Performed by: PHYSICIAN ASSISTANT

## 2021-10-18 NOTE — TELEPHONE ENCOUNTER
Spoke with Zhanna Tripathi and recommended pt be seen at [facility] since she had a fall, pt may need an xray to rule out any other injury now that she has developed a \"bump\" with her bruising.  George Ohara understanding and asked this writer where baljeet Brewer is l

## 2021-10-18 NOTE — ED PROVIDER NOTES
Patient Seen in: Immediate [address]      History   Patient presents with:  Fall  Contusion    Stated Complaint: fall    Subjective:   HPI    63-year-old female presents to immediate care for accidental fall that happened Thursday.   Patient l ANGELA (CPT=[id number])  05/2017    benign                Social History    Tobacco Use      Smoking status: Former Smoker        Packs/day: 1.00        Types: Cigarettes      Smokeless tobacco: Never Used      Tobacco comment: quit many years ago    [employer] Rhythm: Normal rate and regular rhythm. Pulmonary:      Effort: Pulmonary effort is normal. No respiratory distress. Breath sounds: Normal breath sounds. No wheezing. Abdominal:      Palpations: Abdomen is soft. Tenderness:  There is no abdomi fracture. We will do Tylenol and ice for discomfort. Follow-up with orthopedics if any worsening pain, for any headache, chest pain, shortness of breath, any other concerns go to the emergency department.   Verbal care instructions given, verbal return i

## 2021-10-18 NOTE — TELEPHONE ENCOUNTER
Pt daughter states that her mother fell a few days ago, and she has a big bruise on her bottom, and now theres a lump there that is concerning to her.  So she's wondering what she can do from here, does she need to bring her in for an appointment, or take h

## 2021-10-31 PROCEDURE — 99490 CHRNC CARE MGMT STAFF 1ST 20: CPT

## 2021-11-02 ENCOUNTER — PATIENT OUTREACH (OUTPATIENT)
Dept: CASE MANAGEMENT | Age: 81
End: 2021-11-02

## 2021-11-02 NOTE — PROGRESS NOTES
OLYA verified for CCM monthly outreach. I called patient and left a detailed message to call back. I will follow up with patient at a later time.       Medical record reviewed including recent office visits and test results    Total time spent with jack

## 2021-11-22 DIAGNOSIS — N18.2 TYPE 2 DIABETES MELLITUS WITH STAGE 2 CHRONIC KIDNEY DISEASE, WITHOUT LONG-TERM CURRENT USE OF INSULIN (HCC): ICD-10-CM

## 2021-11-22 DIAGNOSIS — E11.22 TYPE 2 DIABETES MELLITUS WITH STAGE 2 CHRONIC KIDNEY DISEASE, WITHOUT LONG-TERM CURRENT USE OF INSULIN (HCC): ICD-10-CM

## 2021-11-22 RX ORDER — GLIPIZIDE 2.5 MG/1
2.5 TABLET, EXTENDED RELEASE ORAL
Qty: 90 TABLET | Refills: 1 | Status: CANCELLED | OUTPATIENT
Start: 2021-11-22

## 2021-11-22 RX ORDER — ATORVASTATIN CALCIUM 20 MG/1
20 TABLET, FILM COATED ORAL NIGHTLY
Qty: 90 TABLET | Refills: 1 | Status: CANCELLED | OUTPATIENT
Start: 2021-11-22

## 2021-11-22 NOTE — TELEPHONE ENCOUNTER
Daughter calling for refills on medications-requesting refills on Atorvastatin,Glipizide and Metformin. Please send to mail order pharmacy.

## 2021-11-23 ENCOUNTER — PATIENT OUTREACH (OUTPATIENT)
Dept: CASE MANAGEMENT | Age: 81
End: 2021-11-23

## 2021-11-23 DIAGNOSIS — E78.2 MIXED HYPERLIPIDEMIA: ICD-10-CM

## 2021-11-23 DIAGNOSIS — N18.2 TYPE 2 DIABETES MELLITUS WITH STAGE 2 CHRONIC KIDNEY DISEASE, WITHOUT LONG-TERM CURRENT USE OF INSULIN (HCC): ICD-10-CM

## 2021-11-23 DIAGNOSIS — G30.1 LATE ONSET ALZHEIMER'S DISEASE WITHOUT BEHAVIORAL DISTURBANCE (HCC): ICD-10-CM

## 2021-11-23 DIAGNOSIS — M85.89 OSTEOPENIA OF MULTIPLE SITES: ICD-10-CM

## 2021-11-23 DIAGNOSIS — E11.22 TYPE 2 DIABETES MELLITUS WITH STAGE 2 CHRONIC KIDNEY DISEASE, WITHOUT LONG-TERM CURRENT USE OF INSULIN (HCC): ICD-10-CM

## 2021-11-23 DIAGNOSIS — F02.80 LATE ONSET ALZHEIMER'S DISEASE WITHOUT BEHAVIORAL DISTURBANCE (HCC): ICD-10-CM

## 2021-11-23 NOTE — PROGRESS NOTES
11/23/2021  Spoke to [facility] for CCM.       Updates to patient care team/ comments: UTD  Patient reported changes in medications: reports no changes   Med Adherence  Comment: UTD    Health Maintenance:     Diabetes Care A1C due on 09/30/2021 • Patient Reported New Barriers And Concerns: n/a                   - Plan for overcoming all barriers: encouraged patient to call with any questions or concerns . Patient agrees to goal action plan.   Self-Management Abilities (patient repor

## 2021-11-24 DIAGNOSIS — N18.2 TYPE 2 DIABETES MELLITUS WITH STAGE 2 CHRONIC KIDNEY DISEASE, WITHOUT LONG-TERM CURRENT USE OF INSULIN (HCC): ICD-10-CM

## 2021-11-24 DIAGNOSIS — E11.22 TYPE 2 DIABETES MELLITUS WITH STAGE 2 CHRONIC KIDNEY DISEASE, WITHOUT LONG-TERM CURRENT USE OF INSULIN (HCC): ICD-10-CM

## 2021-11-24 NOTE — TELEPHONE ENCOUNTER
Requested Prescriptions     metFORMIN HCl 1000 MG Oral Tab         Sig: Take 1 tablet (1,000 mg total) by mouth 2 (two) times daily with meals.     Disp:  180 tablet    Refills:  1    Start: 11/24/2021    Class: Normal    Non-formulary For: Type 2 diabetes

## 2021-11-29 RX ORDER — GLIPIZIDE 2.5 MG/1
2.5 TABLET, EXTENDED RELEASE ORAL
Qty: 90 TABLET | Refills: 1 | OUTPATIENT
Start: 2021-11-29

## 2021-11-29 RX ORDER — ATORVASTATIN CALCIUM 20 MG/1
20 TABLET, FILM COATED ORAL NIGHTLY
Qty: 90 TABLET | Refills: 1 | OUTPATIENT
Start: 2021-11-29

## 2021-11-30 PROCEDURE — 99490 CHRNC CARE MGMT STAFF 1ST 20: CPT

## 2021-12-06 ENCOUNTER — PATIENT OUTREACH (OUTPATIENT)
Dept: CASE MANAGEMENT | Age: 81
End: 2021-12-06

## 2022-01-05 ENCOUNTER — PATIENT OUTREACH (OUTPATIENT)
Dept: CASE MANAGEMENT | Age: 82
End: 2022-01-05

## 2022-01-10 ENCOUNTER — PATIENT OUTREACH (OUTPATIENT)
Dept: CASE MANAGEMENT | Age: 82
End: 2022-01-10

## 2022-01-10 NOTE — PROGRESS NOTES
Patients daughter Nahomi Alberts returned call and would like to cancel CCM. Currently at a facility taking care of all needs. Daughter thanked me for all the help. Patient identified with a potential need for Chronic Care Management services.   Called patient t

## 2022-02-23 ENCOUNTER — HOSPITAL ENCOUNTER (EMERGENCY)
Facility: HOSPITAL | Age: 82
Discharge: HOME | End: 2022-02-24
Attending: EMERGENCY MEDICINE
Payer: MEDICARE

## 2022-02-23 ENCOUNTER — APPOINTMENT (OUTPATIENT)
Dept: CT IMAGING | Facility: HOSPITAL | Age: 82
End: 2022-02-23
Attending: EMERGENCY MEDICINE
Payer: MEDICARE

## 2022-02-23 VITALS
RESPIRATION RATE: 16 BRPM | HEIGHT: 63 IN | WEIGHT: 130 LBS | TEMPERATURE: 97 F | OXYGEN SATURATION: 100 % | HEART RATE: 67 BPM | DIASTOLIC BLOOD PRESSURE: 67 MMHG | BODY MASS INDEX: 23.04 KG/M2 | SYSTOLIC BLOOD PRESSURE: 121 MMHG

## 2022-02-23 DIAGNOSIS — S01.01XA LACERATION OF SCALP, INITIAL ENCOUNTER: Primary | ICD-10-CM

## 2022-02-23 LAB
ANION GAP SERPL CALC-SCNC: 4 MMOL/L (ref 0–18)
BASOPHILS # BLD AUTO: 0.04 X10(3) UL (ref 0–0.2)
BASOPHILS NFR BLD AUTO: 0.6 %
BUN BLD-MCNC: 12 MG/DL (ref 7–18)
CALCIUM BLD-MCNC: 8.8 MG/DL (ref 8.5–10.1)
CHLORIDE SERPL-SCNC: 103 MMOL/L (ref 98–112)
CO2 SERPL-SCNC: 26 MMOL/L (ref 21–32)
CREAT BLD-MCNC: 0.46 MG/DL
EOSINOPHIL # BLD AUTO: 0.49 X10(3) UL (ref 0–0.7)
EOSINOPHIL NFR BLD AUTO: 7.8 %
ERYTHROCYTE [DISTWIDTH] IN BLOOD BY AUTOMATED COUNT: 12.7 %
GLUCOSE BLD-MCNC: 204 MG/DL (ref 70–99)
HCT VFR BLD AUTO: 33.9 %
HGB BLD-MCNC: 11.2 G/DL
IMM GRANULOCYTES # BLD AUTO: 0.02 X10(3) UL (ref 0–1)
IMM GRANULOCYTES NFR BLD: 0.3 %
LYMPHOCYTES # BLD AUTO: 1.97 X10(3) UL (ref 1–4)
LYMPHOCYTES NFR BLD AUTO: 31.2 %
MCH RBC QN AUTO: 30.9 PG (ref 26–34)
MCHC RBC AUTO-ENTMCNC: 33 G/DL (ref 31–37)
MCV RBC AUTO: 93.4 FL
MONOCYTES # BLD AUTO: 0.5 X10(3) UL (ref 0.1–1)
MONOCYTES NFR BLD AUTO: 7.9 %
NEUTROPHILS # BLD AUTO: 3.3 X10 (3) UL (ref 1.5–7.7)
NEUTROPHILS # BLD AUTO: 3.3 X10(3) UL (ref 1.5–7.7)
NEUTROPHILS NFR BLD AUTO: 52.2 %
OSMOLALITY SERPL CALC.SUM OF ELEC: 282 MOSM/KG (ref 275–295)
PLATELET # BLD AUTO: 225 10(3)UL (ref 150–450)
POTASSIUM SERPL-SCNC: 4.1 MMOL/L (ref 3.5–5.1)
RBC # BLD AUTO: 3.63 X10(6)UL
SODIUM SERPL-SCNC: 133 MMOL/L (ref 136–145)
WBC # BLD AUTO: 6.3 X10(3) UL (ref 4–11)

## 2022-02-23 PROCEDURE — 85025 COMPLETE CBC W/AUTO DIFF WBC: CPT | Performed by: EMERGENCY MEDICINE

## 2022-02-23 PROCEDURE — 36415 COLL VENOUS BLD VENIPUNCTURE: CPT

## 2022-02-23 PROCEDURE — 80048 BASIC METABOLIC PNL TOTAL CA: CPT | Performed by: EMERGENCY MEDICINE

## 2022-02-23 PROCEDURE — 70450 CT HEAD/BRAIN W/O DYE: CPT | Performed by: EMERGENCY MEDICINE

## 2022-02-23 PROCEDURE — 12001 RPR S/N/AX/GEN/TRNK 2.5CM/<: CPT

## 2022-02-23 PROCEDURE — 99284 EMERGENCY DEPT VISIT MOD MDM: CPT

## 2022-02-24 NOTE — ED INITIAL ASSESSMENT (HPI)
Patient here with c/o unwitnessed fall in NH, landing on her left side. Patient has laceration to left side of head.   No LOC

## 2022-07-27 ENCOUNTER — TELEPHONE (OUTPATIENT)
Dept: FAMILY MEDICINE CLINIC | Facility: CLINIC | Age: 82
End: 2022-07-27

## 2022-08-18 ENCOUNTER — TELEPHONE (OUTPATIENT)
Dept: FAMILY MEDICINE CLINIC | Facility: CLINIC | Age: 82
End: 2022-08-18

## 2022-08-18 NOTE — TELEPHONE ENCOUNTER
Spoke to daughter who states that patient is in a nursing home and can no longer walk. Her daughter is not able to bring her to the office. She does have a doctor there who sees her. Daughter will call the insurance company/nursing home to see if there is a bus that can transport her to the office. If not, she will call the insurance company to change PCPs.

## 2022-12-24 ENCOUNTER — APPOINTMENT (OUTPATIENT)
Dept: GENERAL RADIOLOGY | Facility: HOSPITAL | Age: 82
End: 2022-12-24
Attending: EMERGENCY MEDICINE
Payer: MEDICARE

## 2022-12-24 ENCOUNTER — HOSPITAL ENCOUNTER (EMERGENCY)
Facility: HOSPITAL | Age: 82
Discharge: HOME | End: 2022-12-24
Attending: EMERGENCY MEDICINE
Payer: MEDICARE

## 2022-12-24 VITALS
RESPIRATION RATE: 18 BRPM | BODY MASS INDEX: 23 KG/M2 | OXYGEN SATURATION: 100 % | DIASTOLIC BLOOD PRESSURE: 57 MMHG | SYSTOLIC BLOOD PRESSURE: 125 MMHG | TEMPERATURE: 97 F | WEIGHT: 132.25 LBS | HEART RATE: 75 BPM

## 2022-12-24 DIAGNOSIS — S32.010A CLOSED COMPRESSION FRACTURE OF BODY OF L1 VERTEBRA (HCC): ICD-10-CM
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
## 2022-12-24 PROCEDURE — 99284 EMERGENCY DEPT VISIT MOD MDM: CPT

## 2022-12-24 PROCEDURE — 72100 X-RAY EXAM L-S SPINE 2/3 VWS: CPT | Performed by: EMERGENCY MEDICINE

## 2022-12-25 NOTE — ED INITIAL ASSESSMENT (HPI)
Pt sent to ER from nursing care facility for eval post fall one week ago. Per EMS, family requested ER eval due to not being able to get approval for xray for insurance.

## 2022-12-27 ENCOUNTER — PATIENT OUTREACH (OUTPATIENT)
Dept: CASE MANAGEMENT | Age: 82
End: 2022-12-27

## 2022-12-27 NOTE — PROGRESS NOTES
1st attempt; pt had recent ED visit, calling to offer PCP f/u apt (dc 12/24)      Dr. Ashok Busby [id number] [phone]      LVM for pt if assisted still needed call [phone]

## 2023-01-01 ENCOUNTER — APPOINTMENT (OUTPATIENT)
Dept: GENERAL RADIOLOGY | Facility: HOSPITAL | Age: 83
End: 2023-01-01
Attending: EMERGENCY MEDICINE
Payer: MEDICARE

## 2023-01-01 ENCOUNTER — HOSPITAL ENCOUNTER (INPATIENT)
Facility: HOSPITAL | Age: 83
LOS: 2 days | End: 2023-01-01
Attending: EMERGENCY MEDICINE | Admitting: INTERNAL MEDICINE
Payer: MEDICARE

## 2023-01-01 ENCOUNTER — APPOINTMENT (OUTPATIENT)
Dept: CT IMAGING | Facility: HOSPITAL | Age: 83
End: 2023-01-01
Attending: EMERGENCY MEDICINE
Payer: MEDICARE

## 2023-01-01 ENCOUNTER — APPOINTMENT (OUTPATIENT)
Dept: ULTRASOUND IMAGING | Facility: HOSPITAL | Age: 83
End: 2023-01-01
Attending: EMERGENCY MEDICINE
Payer: MEDICARE

## 2023-01-01 ENCOUNTER — APPOINTMENT (OUTPATIENT)
Dept: GENERAL RADIOLOGY | Facility: HOSPITAL | Age: 83
End: 2023-01-01
Attending: INTERNAL MEDICINE
Payer: MEDICARE

## 2023-01-01 VITALS
WEIGHT: 102.69 LBS | HEART RATE: 63 BPM | DIASTOLIC BLOOD PRESSURE: 62 MMHG | RESPIRATION RATE: 16 BRPM | BODY MASS INDEX: 19.39 KG/M2 | SYSTOLIC BLOOD PRESSURE: 109 MMHG | TEMPERATURE: 99 F | HEIGHT: 61 IN | OXYGEN SATURATION: 97 %

## 2023-01-01 DIAGNOSIS — K74.60 LIVER CIRRHOSIS SECONDARY TO NASH (HCC): ICD-10-CM

## 2023-01-01 DIAGNOSIS — E86.0 DEHYDRATION: ICD-10-CM

## 2023-01-01 DIAGNOSIS — R53.1 WEAKNESS GENERALIZED: ICD-10-CM

## 2023-01-01 DIAGNOSIS — N39.0 URINARY TRACT INFECTION WITHOUT HEMATURIA, SITE UNSPECIFIED: Primary | ICD-10-CM

## 2023-01-01 DIAGNOSIS — K75.81 LIVER CIRRHOSIS SECONDARY TO NASH (HCC): ICD-10-CM

## 2023-01-01 LAB
ALBUMIN SERPL-MCNC: 2.8 G/DL (ref 3.4–5)
ALBUMIN SERPL-MCNC: 3.3 G/DL (ref 3.4–5)
ALBUMIN/GLOB SERPL: 0.9 {RATIO} (ref 1–2)
ALBUMIN/GLOB SERPL: 1 {RATIO} (ref 1–2)
ALP LIVER SERPL-CCNC: 286 U/L
ALP LIVER SERPL-CCNC: 318 U/L
ALT SERPL-CCNC: 169 U/L
ALT SERPL-CCNC: 192 U/L
AMMONIA PLAS-MCNC: 34 UMOL/L (ref 11–32)
ANION GAP SERPL CALC-SCNC: 5 MMOL/L (ref 0–18)
ANION GAP SERPL CALC-SCNC: 7 MMOL/L (ref 0–18)
AST SERPL-CCNC: 256 U/L (ref 15–37)
AST SERPL-CCNC: 282 U/L (ref 15–37)
ATRIAL RATE: 70 BPM
BASOPHILS # BLD AUTO: 0.01 X10(3) UL (ref 0–0.2)
BASOPHILS # BLD AUTO: 0.02 X10(3) UL (ref 0–0.2)
BASOPHILS NFR BLD AUTO: 0.2 %
BASOPHILS NFR BLD AUTO: 0.4 %
BILIRUB SERPL-MCNC: 4.6 MG/DL (ref 0.1–2)
BILIRUB SERPL-MCNC: 5.3 MG/DL (ref 0.1–2)
BILIRUB UR QL CFM: POSITIVE
BUN BLD-MCNC: 13 MG/DL (ref 7–18)
BUN BLD-MCNC: 19 MG/DL (ref 7–18)
CALCIUM BLD-MCNC: 9 MG/DL (ref 8.5–10.1)
CALCIUM BLD-MCNC: 9.4 MG/DL (ref 8.5–10.1)
CHLORIDE SERPL-SCNC: 106 MMOL/L (ref 98–112)
CHLORIDE SERPL-SCNC: 108 MMOL/L (ref 98–112)
CO2 SERPL-SCNC: 25 MMOL/L (ref 21–32)
CO2 SERPL-SCNC: 27 MMOL/L (ref 21–32)
CREAT BLD-MCNC: 0.53 MG/DL
CREAT BLD-MCNC: 0.69 MG/DL
EOSINOPHIL # BLD AUTO: 0.69 X10(3) UL (ref 0–0.7)
EOSINOPHIL # BLD AUTO: 0.83 X10(3) UL (ref 0–0.7)
EOSINOPHIL NFR BLD AUTO: 14.6 %
EOSINOPHIL NFR BLD AUTO: 16.5 %
ERYTHROCYTE [DISTWIDTH] IN BLOOD BY AUTOMATED COUNT: 13.4 %
ERYTHROCYTE [DISTWIDTH] IN BLOOD BY AUTOMATED COUNT: 13.5 %
GFR SERPLBLD BASED ON 1.73 SQ M-ARVRAT: 87 ML/MIN/1.73M2 (ref 60–?)
GFR SERPLBLD BASED ON 1.73 SQ M-ARVRAT: 92 ML/MIN/1.73M2 (ref 60–?)
GLOBULIN PLAS-MCNC: 3.2 G/DL (ref 2.8–4.4)
GLOBULIN PLAS-MCNC: 3.4 G/DL (ref 2.8–4.4)
GLUCOSE BLD-MCNC: 134 MG/DL (ref 70–99)
GLUCOSE BLD-MCNC: 148 MG/DL (ref 70–99)
GLUCOSE BLD-MCNC: 152 MG/DL (ref 70–99)
GLUCOSE BLD-MCNC: 156 MG/DL (ref 70–99)
GLUCOSE BLD-MCNC: 158 MG/DL (ref 70–99)
GLUCOSE BLD-MCNC: 159 MG/DL (ref 70–99)
GLUCOSE BLD-MCNC: 165 MG/DL (ref 70–99)
GLUCOSE BLD-MCNC: 227 MG/DL (ref 70–99)
GLUCOSE BLD-MCNC: 229 MG/DL (ref 70–99)
GLUCOSE BLD-MCNC: 231 MG/DL (ref 70–99)
GLUCOSE UR STRIP.AUTO-MCNC: >=500 MG/DL
HCT VFR BLD AUTO: 34.4 %
HCT VFR BLD AUTO: 36.2 %
HGB BLD-MCNC: 11.6 G/DL
HGB BLD-MCNC: 12.3 G/DL
IMM GRANULOCYTES # BLD AUTO: 0.01 X10(3) UL (ref 0–1)
IMM GRANULOCYTES # BLD AUTO: 0.01 X10(3) UL (ref 0–1)
IMM GRANULOCYTES NFR BLD: 0.2 %
IMM GRANULOCYTES NFR BLD: 0.2 %
INR BLD: 1.18 (ref 0.85–1.16)
INR BLD: 1.28 (ref 0.85–1.16)
LIPASE SERPL-CCNC: 30 U/L (ref 73–393)
LIPASE SERPL-CCNC: 7 U/L (ref 13–75)
LYMPHOCYTES # BLD AUTO: 0.93 X10(3) UL (ref 1–4)
LYMPHOCYTES # BLD AUTO: 0.94 X10(3) UL (ref 1–4)
LYMPHOCYTES NFR BLD AUTO: 18.5 %
LYMPHOCYTES NFR BLD AUTO: 20 %
MCH RBC QN AUTO: 31 PG (ref 26–34)
MCH RBC QN AUTO: 31.1 PG (ref 26–34)
MCHC RBC AUTO-ENTMCNC: 33.7 G/DL (ref 31–37)
MCHC RBC AUTO-ENTMCNC: 34 G/DL (ref 31–37)
MCV RBC AUTO: 91.2 FL
MCV RBC AUTO: 92.2 FL
MONOCYTES # BLD AUTO: 0.33 X10(3) UL (ref 0.1–1)
MONOCYTES # BLD AUTO: 0.35 X10(3) UL (ref 0.1–1)
MONOCYTES NFR BLD AUTO: 7 %
MONOCYTES NFR BLD AUTO: 7 %
NEUTROPHILS # BLD AUTO: 2.73 X10 (3) UL (ref 1.5–7.7)
NEUTROPHILS # BLD AUTO: 2.73 X10(3) UL (ref 1.5–7.7)
NEUTROPHILS # BLD AUTO: 2.88 X10 (3) UL (ref 1.5–7.7)
NEUTROPHILS # BLD AUTO: 2.88 X10(3) UL (ref 1.5–7.7)
NEUTROPHILS NFR BLD AUTO: 57.4 %
NEUTROPHILS NFR BLD AUTO: 58 %
NITRITE UR QL STRIP.AUTO: POSITIVE
OSMOLALITY SERPL CALC.SUM OF ELEC: 293 MOSM/KG (ref 275–295)
OSMOLALITY SERPL CALC.SUM OF ELEC: 296 MOSM/KG (ref 275–295)
P AXIS: 31 DEGREES
P-R INTERVAL: 152 MS
PH UR STRIP.AUTO: 5 [PH] (ref 5–8)
PLATELET # BLD AUTO: 222 10(3)UL (ref 150–450)
PLATELET # BLD AUTO: 224 10(3)UL (ref 150–450)
POTASSIUM SERPL-SCNC: 2.8 MMOL/L (ref 3.5–5.1)
POTASSIUM SERPL-SCNC: 2.9 MMOL/L (ref 3.5–5.1)
POTASSIUM SERPL-SCNC: 3 MMOL/L (ref 3.5–5.1)
POTASSIUM SERPL-SCNC: 3.9 MMOL/L (ref 3.5–5.1)
PROT SERPL-MCNC: 6 G/DL (ref 6.4–8.2)
PROT SERPL-MCNC: 6.7 G/DL (ref 6.4–8.2)
PROT UR STRIP.AUTO-MCNC: 100 MG/DL
PROTHROMBIN TIME: 15 SECONDS (ref 11.6–14.8)
PROTHROMBIN TIME: 15.9 SECONDS (ref 11.6–14.8)
Q-T INTERVAL: 408 MS
QRS DURATION: 60 MS
QTC CALCULATION (BEZET): 440 MS
R AXIS: -42 DEGREES
RBC # BLD AUTO: 3.73 X10(6)UL
RBC # BLD AUTO: 3.97 X10(6)UL
RBC #/AREA URNS AUTO: >10 /HPF
SARS-COV-2 RNA RESP QL NAA+PROBE: NOT DETECTED
SODIUM SERPL-SCNC: 138 MMOL/L (ref 136–145)
SODIUM SERPL-SCNC: 140 MMOL/L (ref 136–145)
SP GR UR STRIP.AUTO: 1.03 (ref 1–1.03)
T AXIS: 19 DEGREES
TROPONIN I HIGH SENSITIVITY: 8 NG/L
UROBILINOGEN UR STRIP.AUTO-MCNC: 4 MG/DL
VENTRICULAR RATE: 70 BPM
WBC # BLD AUTO: 4.7 X10(3) UL (ref 4–11)
WBC # BLD AUTO: 5 X10(3) UL (ref 4–11)
WBC #/AREA URNS AUTO: >50 /HPF
WBC CLUMPS UR QL AUTO: PRESENT /HPF

## 2023-01-01 PROCEDURE — 85610 PROTHROMBIN TIME: CPT | Performed by: EMERGENCY MEDICINE

## 2023-01-01 PROCEDURE — 84132 ASSAY OF SERUM POTASSIUM: CPT | Performed by: INTERNAL MEDICINE

## 2023-01-01 PROCEDURE — 88172 CYTP DX EVAL FNA 1ST EA SITE: CPT | Performed by: INTERNAL MEDICINE

## 2023-01-01 PROCEDURE — 85610 PROTHROMBIN TIME: CPT | Performed by: INTERNAL MEDICINE

## 2023-01-01 PROCEDURE — 82962 GLUCOSE BLOOD TEST: CPT

## 2023-01-01 PROCEDURE — 93005 ELECTROCARDIOGRAM TRACING: CPT

## 2023-01-01 PROCEDURE — 76700 US EXAM ABDOM COMPLETE: CPT | Performed by: EMERGENCY MEDICINE

## 2023-01-01 PROCEDURE — 87086 URINE CULTURE/COLONY COUNT: CPT | Performed by: EMERGENCY MEDICINE

## 2023-01-01 PROCEDURE — 96365 THER/PROPH/DIAG IV INF INIT: CPT

## 2023-01-01 PROCEDURE — 80053 COMPREHEN METABOLIC PANEL: CPT | Performed by: EMERGENCY MEDICINE

## 2023-01-01 PROCEDURE — 83690 ASSAY OF LIPASE: CPT | Performed by: EMERGENCY MEDICINE

## 2023-01-01 PROCEDURE — 74328 X-RAY BILE DUCT ENDOSCOPY: CPT | Performed by: INTERNAL MEDICINE

## 2023-01-01 PROCEDURE — 97162 PT EVAL MOD COMPLEX 30 MIN: CPT

## 2023-01-01 PROCEDURE — 87186 SC STD MICRODIL/AGAR DIL: CPT | Performed by: EMERGENCY MEDICINE

## 2023-01-01 PROCEDURE — 88177 CYTP FNA EVAL EA ADDL: CPT | Performed by: INTERNAL MEDICINE

## 2023-01-01 PROCEDURE — 81001 URINALYSIS AUTO W/SCOPE: CPT | Performed by: EMERGENCY MEDICINE

## 2023-01-01 PROCEDURE — 85025 COMPLETE CBC W/AUTO DIFF WBC: CPT | Performed by: EMERGENCY MEDICINE

## 2023-01-01 PROCEDURE — 0F798DZ DILATION OF COMMON BILE DUCT WITH INTRALUMINAL DEVICE, VIA NATURAL OR ARTIFICIAL OPENING ENDOSCOPIC: ICD-10-PCS | Performed by: INTERNAL MEDICINE

## 2023-01-01 PROCEDURE — 87040 BLOOD CULTURE FOR BACTERIA: CPT | Performed by: INTERNAL MEDICINE

## 2023-01-01 PROCEDURE — 88305 TISSUE EXAM BY PATHOLOGIST: CPT | Performed by: INTERNAL MEDICINE

## 2023-01-01 PROCEDURE — 93010 ELECTROCARDIOGRAM REPORT: CPT

## 2023-01-01 PROCEDURE — 99285 EMERGENCY DEPT VISIT HI MDM: CPT

## 2023-01-01 PROCEDURE — 88173 CYTOPATH EVAL FNA REPORT: CPT | Performed by: INTERNAL MEDICINE

## 2023-01-01 PROCEDURE — 87077 CULTURE AEROBIC IDENTIFY: CPT | Performed by: EMERGENCY MEDICINE

## 2023-01-01 PROCEDURE — 71045 X-RAY EXAM CHEST 1 VIEW: CPT | Performed by: EMERGENCY MEDICINE

## 2023-01-01 PROCEDURE — 82140 ASSAY OF AMMONIA: CPT | Performed by: EMERGENCY MEDICINE

## 2023-01-01 PROCEDURE — 74177 CT ABD & PELVIS W/CONTRAST: CPT | Performed by: EMERGENCY MEDICINE

## 2023-01-01 PROCEDURE — 96361 HYDRATE IV INFUSION ADD-ON: CPT

## 2023-01-01 PROCEDURE — 97165 OT EVAL LOW COMPLEX 30 MIN: CPT

## 2023-01-01 PROCEDURE — 80053 COMPREHEN METABOLIC PANEL: CPT | Performed by: INTERNAL MEDICINE

## 2023-01-01 PROCEDURE — 0F9G8ZZ DRAINAGE OF PANCREAS, VIA NATURAL OR ARTIFICIAL OPENING ENDOSCOPIC: ICD-10-PCS | Performed by: INTERNAL MEDICINE

## 2023-01-01 PROCEDURE — 97530 THERAPEUTIC ACTIVITIES: CPT

## 2023-01-01 PROCEDURE — 85025 COMPLETE CBC W/AUTO DIFF WBC: CPT | Performed by: INTERNAL MEDICINE

## 2023-01-01 PROCEDURE — BF101ZZ FLUOROSCOPY OF BILE DUCTS USING LOW OSMOLAR CONTRAST: ICD-10-PCS | Performed by: INTERNAL MEDICINE

## 2023-01-01 PROCEDURE — 84484 ASSAY OF TROPONIN QUANT: CPT | Performed by: EMERGENCY MEDICINE

## 2023-01-01 DEVICE — GORE VIABIL BILIARY ENDO 10MMX6CM 8.5FR
Type: IMPLANTABLE DEVICE | Status: FUNCTIONAL
Brand: GORE VIABIL BILIARY ENDOPROSTHESIS

## 2023-01-01 RX ORDER — IBUPROFEN 200 MG
200 TABLET ORAL EVERY 8 HOURS PRN
COMMUNITY

## 2023-01-01 RX ORDER — ONDANSETRON 4 MG/1
4 TABLET, ORALLY DISINTEGRATING ORAL EVERY 6 HOURS PRN
COMMUNITY

## 2023-01-01 RX ORDER — DOCUSATE SODIUM 100 MG/1
100 CAPSULE, LIQUID FILLED ORAL 2 TIMES DAILY
COMMUNITY

## 2023-01-01 RX ORDER — SODIUM CHLORIDE 9 MG/ML
125 INJECTION, SOLUTION INTRAVENOUS CONTINUOUS
Status: DISCONTINUED | OUTPATIENT
Start: 2023-01-01 | End: 2023-01-01

## 2023-01-01 RX ORDER — SODIUM CHLORIDE 9 MG/ML
INJECTION, SOLUTION INTRAVENOUS CONTINUOUS
Status: DISCONTINUED | OUTPATIENT
Start: 2023-01-01 | End: 2023-01-01

## 2023-01-01 RX ORDER — GLIMEPIRIDE 4 MG/1
4 TABLET ORAL
COMMUNITY

## 2023-01-01 RX ORDER — POTASSIUM CHLORIDE 20 MEQ/1
20 TABLET, EXTENDED RELEASE ORAL ONCE
Status: COMPLETED | OUTPATIENT
Start: 2023-01-01 | End: 2023-01-01

## 2023-01-01 RX ORDER — POTASSIUM CHLORIDE 14.9 MG/ML
20 INJECTION INTRAVENOUS ONCE
Status: DISCONTINUED | OUTPATIENT
Start: 2023-01-01 | End: 2023-01-01

## 2023-01-01 RX ORDER — BISACODYL 10 MG
10 SUPPOSITORY, RECTAL RECTAL DAILY PRN
COMMUNITY

## 2023-01-01 RX ORDER — HEPARIN SODIUM 5000 [USP'U]/ML
5000 INJECTION, SOLUTION INTRAVENOUS; SUBCUTANEOUS EVERY 12 HOURS SCHEDULED
Status: DISCONTINUED | OUTPATIENT
Start: 2023-01-01 | End: 2023-01-01

## 2023-02-01 ENCOUNTER — TELEPHONE (OUTPATIENT)
Dept: FAMILY MEDICINE CLINIC | Facility: CLINIC | Age: 83
End: 2023-02-01

## 2023-03-05 PROBLEM — E86.0 DEHYDRATION: Status: ACTIVE | Noted: 2023-03-05

## 2023-03-05 PROBLEM — R53.1 WEAKNESS GENERALIZED: Status: ACTIVE | Noted: 2023-01-01

## 2023-03-05 PROBLEM — K74.60 LIVER CIRRHOSIS SECONDARY TO NASH (HCC): Status: ACTIVE | Noted: 2023-01-01

## 2023-03-05 PROBLEM — R53.1 WEAKNESS GENERALIZED: Status: ACTIVE | Noted: 2023-03-05

## 2023-03-05 PROBLEM — K75.81 LIVER CIRRHOSIS SECONDARY TO NASH (HCC): Status: ACTIVE | Noted: 2023-03-05

## 2023-03-05 PROBLEM — K74.60 LIVER CIRRHOSIS SECONDARY TO NASH (HCC): Status: ACTIVE | Noted: 2023-03-05

## 2023-03-05 PROBLEM — E86.0 DEHYDRATION: Status: ACTIVE | Noted: 2023-01-01

## 2023-03-05 PROBLEM — N39.0 URINARY TRACT INFECTION WITHOUT HEMATURIA, SITE UNSPECIFIED: Status: ACTIVE | Noted: 2023-03-05

## 2023-03-05 PROBLEM — N39.0 URINARY TRACT INFECTION WITHOUT HEMATURIA, SITE UNSPECIFIED: Status: ACTIVE | Noted: 2023-01-01

## 2023-03-05 PROBLEM — K75.81 LIVER CIRRHOSIS SECONDARY TO NASH (HCC): Status: ACTIVE | Noted: 2023-01-01

## 2023-03-05 NOTE — ED QUICK NOTES
[facility] held several of her medications below due to jaundice concerns:     -Metformin- due to poor appetite  -Tylenol  -Gilmepiride  -Atorvastatin Calcium  -

## 2023-03-05 NOTE — ED INITIAL ASSESSMENT (HPI)
C/o weakness, poor appetite, jaundice, since yesterday; from [facility]; hx of \"Daniels\" hepitsis; DNR on file

## 2023-03-06 ENCOUNTER — ANESTHESIA EVENT (OUTPATIENT)
Dept: ENDOSCOPY | Facility: HOSPITAL | Age: 83
End: 2023-03-06
Payer: MEDICARE

## 2023-03-06 ENCOUNTER — ANESTHESIA (OUTPATIENT)
Dept: ENDOSCOPY | Facility: HOSPITAL | Age: 83
End: 2023-03-06
Payer: MEDICARE

## 2023-03-06 RX ORDER — ESMOLOL HYDROCHLORIDE 10 MG/ML
INJECTION INTRAVENOUS AS NEEDED
Status: DISCONTINUED | OUTPATIENT
Start: 2023-03-06 | End: 2023-03-06 | Stop reason: SURG

## 2023-03-06 RX ORDER — SODIUM CHLORIDE, SODIUM LACTATE, POTASSIUM CHLORIDE, CALCIUM CHLORIDE 600; 310; 30; 20 MG/100ML; MG/100ML; MG/100ML; MG/100ML
INJECTION, SOLUTION INTRAVENOUS CONTINUOUS PRN
Status: DISCONTINUED | OUTPATIENT
Start: 2023-03-06 | End: 2023-03-06 | Stop reason: SURG

## 2023-03-06 RX ORDER — LIDOCAINE HYDROCHLORIDE 20 MG/ML
INJECTION, SOLUTION EPIDURAL; INFILTRATION; INTRACAUDAL; PERINEURAL AS NEEDED
Status: DISCONTINUED | OUTPATIENT
Start: 2023-03-06 | End: 2023-03-06 | Stop reason: SURG

## 2023-03-06 RX ADMIN — LIDOCAINE HYDROCHLORIDE 50 MG: 20 INJECTION, SOLUTION EPIDURAL; INFILTRATION; INTRACAUDAL; PERINEURAL at 16:02:00

## 2023-03-06 RX ADMIN — ESMOLOL HYDROCHLORIDE 40 MG: 10 INJECTION INTRAVENOUS at 16:13:00

## 2023-03-06 RX ADMIN — SODIUM CHLORIDE, SODIUM LACTATE, POTASSIUM CHLORIDE, CALCIUM CHLORIDE: 600; 310; 30; 20 INJECTION, SOLUTION INTRAVENOUS at 15:56:00

## 2023-03-06 NOTE — ED QUICK NOTES
[facility] called asking for update; let them know pt was being admitted and the admitting dx's.

## 2023-03-06 NOTE — CM/SW NOTE
SW noted that patient is a long term resident at [facility] in their memory care unit. SW sent return referral to [facility] via [facility]. SW will continue to follow for plan of care changes and remain available for any additional DC needs or concerns.      Rebeca Torres MSW, LSW  Discharge Planner   [phone]

## 2023-03-06 NOTE — PLAN OF CARE
NURSING ADMISSION NOTE    Patient admitted via Cart  Oriented to room. Safety precautions initiated. Bed in low position. Call light in reach. Pt received from ER. Pt a/o x1. VSS on RA. No c/o pain at this time. Pt incontinent, external catheter placed. Meds per MAR. IVF per order. Potassium replaced per protocol. Pt pulled IV, new IV placed on L wrist. PT/OT ordered. GI consulted this AM. Keep NPO for possible ercp. Relayed to next shift. Fall risk protocol followed, call light within reach.

## 2023-03-06 NOTE — ANESTHESIA POSTPROCEDURE EVALUATION
[address] Patient Status:  Inpatient   Age/Gender 80year old female MRN [id number]   Location [address] [address] Attending Tatianna Cagle MD   Hosp Day # 1 PCP Vivi Cochran MD       Anesthesia Post-op Note    ENDOSCOPIC ULTRASOUND (EUS) WITH FNA , ENDOSCOPIC RETROGRADE CHOLANGIOPANCREATOGRAPHY WITH SPHINCERTOMY AND STENT PLACEMENT    Procedure Summary     Date: 03/06/23 Room / Location: [facility] ENDOSCOPY 02 / [address] ENDOSCOPY    Anesthesia Start: 1912 Anesthesia Stop: 5329    Procedure: ENDOSCOPIC ULTRASOUND (EUS) WITH FNA , ENDOSCOPIC RETROGRADE CHOLANGIOPANCREATOGRAPHY WITH SPHINCERTOMY AND STENT PLACEMENT Diagnosis: (EUS: PANCREATIC HEAD MASS ERCP: BILE DUCT STRICTURE)    Surgeons: Rashad Gonzalez MD Anesthesiologist: Iker Bhatia MD    Anesthesia Type: MAC ASA Status: 3          Anesthesia Type: MAC    Vitals Value Taken Time   /81 03/06/23 1644   Temp   03/06/23 1644   Pulse 83 03/06/23 1644   Resp 14 03/06/23 1644   SpO2 99 03/06/23 1644       Patient Location: Endoscopy    Anesthesia Type: MAC    Airway Patency: patent    Postop Pain Control: adequate    Mental Status: mildly sedated but able to meaningfully participate in the post-anesthesia evaluation    Nausea/Vomiting: none    Cardiopulmonary/Hydration status: stable euvolemic    Complications: no apparent anesthesia related complications    Postop vital signs: stable    Dental Exam: Unchanged from Preop    Patient to be discharged from PACU when criteria met.

## 2023-03-06 NOTE — OPERATIVE REPORT
OPERATIVE REPORT   PATIENT NAME: Wilbur Reynoso  MRN: [id number]  DATE OF OPERATION: 3/5/2023 - 3/6/2023  PREOPERATIVE DIAGNOSIS:   1. Obstructive jaundice  POSTOPERATIVE DIAGNOSES:  1. 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 Endoscopic ultrasound with fine-needle aspiration. 2.  ERCP, biliary sphincterotomy, placement of fully covered biliary self-expandable [place]-Viabil stent  SURGEON: Yvette Darling MD   MEDICATIONS:    Patient is already on IV antibiotics. Indocin 100 mg suppositories were given as to minimize procedure related pancreatitis    ANESTHESIA: MAC  CONSENT: The procedure and risks of procedure was discussed with the patient daughter in details who has power of  including but not limited to bleeding, perforation, medication reaction, infection and pancreatitis which can be mild to severe with prolonged hospital stay leading up to complications and even death. Alternatives and options, as well as rationale was discussed. Patient daughter verbalizes understanding. All questions answered. SPECIMEN: Pancreatic head mass  COMPLICATIONS: None immediately apparent  PROCEDURE AND FINDINGS:     After placing the patient in the left lateral decubitus position and achieving adequate sedation the Olympus linear echoendoscope was introduced under direct visualization in the esophagus passed into the stomach and second portion of the duodenum. In the head of the pancreas there was a 2.3 cm hypoechoic mass with cystic components appear to obstruct the bile duct and the pancreatic duct. The bile duct measure approximately 2.2 cm in maximum diameter. The gallbladder appeared to be distended. The lesion was sampled through the duodenal wall with a 22-gauge [employer] fine-needle aspiration device x3 needle passes after utilizing Doppler to ensure that there are no interposing blood vessels in the needle track.     The tumor appeared to extend towards the superior mesenteric artery but did not appear to encase it. The superior mesenteric vein, portal vein, celiac artery old appeared to be free of any obvious tumor involvement. The visualized portion of the right and left lobe of liver appeared unremarkable. The main pancreatic duct appeared to be dilated measure approximately 5 mm in maximum diameter. Next, the patient was placed in prone position in preparation for ERCP. The Olympus therapeutic duodenoscope was introduced under direct visualization in the esophagus passed into the stomach and second portion of the duodenum. The major papilla was visualized and appeared unremarkable. Next, utilizing the true [address] catheter preloaded with 0.025 revolution Jagwire/straight tip the common bile duct was freely, and selectively cannulated. Gentle retrograde contrast injection revealed a long distal common bile duct malignant appearing stricture, the intrahepatic ducts appeared to be dilated. The common bile duct appeared to be dilated proximal to the stricture. Biliary sphincterotomy was performed. The catheter was then removed while maintaining the guidewire in place. Over the guidewire a 10 mm - 6 cm long self-expandable biliary ([place]-Viabil)metal stent was deployed under fluoroscopic and endoscopic guidance. The waist of the stent was centered across the stricture. Good and rapid contrast and bile drainage was achieved. No pancreatogram was obtained nor attempt obtain Pigato gram was performed. No guidewire cannulation of the pancreatic duct was encountered. The scope was then removed after suctioning  the gastric content      IMPRESSION:  1. Findings as described above  RECOMMENDATIONS:  1. Await final biopsy results. 2. Monitor liver enzymes  3. Discussed with the patient daughter and son. No plan for surgical intervention. They may consider chemo.   Advised to discuss with oncology as an outpatient given patient dementia and poor functional status  Yvette Darling MD

## 2023-03-06 NOTE — PLAN OF CARE
Pt alert and oriented X1-2. Forgetful at times. VSS. Pt NPO for ERCP. Held heparin and gave abx prior to ERCP. Replaced potassium, redraw 3.9. IV wrapped to prevent it from coming out. Changed and repositioned for comfort as needed. All meds given per [facility], IVF infusing. Fall precautions in place, call light in reach. Received pt from endo, sleepy. Pt on clear liquid diet.  Endo took and sent biopsies, stent placed    Problem: GASTROINTESTINAL - ADULT  Goal: Maintains adequate nutritional intake (undernourished)  Description: INTERVENTIONS:  - Monitor percentage of each meal consumed  - Identify factors contributing to decreased intake, treat as appropriate  - Assist with meals as needed  - Monitor I&O, WT and lab values  - Obtain nutritional consult as needed  - Optimize oral hygiene and moisture  - Encourage food from home; allow for food preferences  - Enhance eating environment  Outcome: Not Progressing

## 2023-03-07 NOTE — PLAN OF CARE
Patient's family came & left.  home notified,spoke to [address] taken by family. Post mortem care rendered. Transprt notified to  body.

## 2023-03-07 NOTE — PLAN OF CARE
Assumed care at 0700. Patient in bed asleep but arousable & verbally responsive. Appears fatigued & pale. Will continue to monitor.

## 2023-03-07 NOTE — PLAN OF CARE
Pt a/o x1. VSS on RA. Meds per MAR. IVF per order. Pt incontinent of bowel & bladder. No acute events overnight. Fall risk protocol followed, call light within reach.      Problem: MUSCULOSKELETAL - ADULT  Goal: Return mobility to safest level of function  Description: INTERVENTIONS:  - Assess patient stability and activity tolerance for standing, transferring and ambulating w/ or w/o assistive devices  - Assist with transfers and ambulation using safe patient handling equipment as needed  - Ensure adequate protection for wounds/incisions during mobilization  - Obtain PT/OT consults as needed  - Advance activity as appropriate  - Communicate ordered activity level and limitations with patient/family  Outcome: Progressing

## 2023-03-07 NOTE — PLAN OF CARE
RN was requested to come to patient's room to assess patient for she appears not looking right. Writer rushed to the room,Nurse supervisor was there & PCT as well assessing patient. Patient was noted not breathing & heart has stopped, pupils were dilated. All vital signs have ceased. All these were verified w/ another RN at bedside. Paged Dr Eliezer Hays & informed of this. Patient's time of death 10:10am.Left a message to patient's daughter jennifer to inform. She then confirmed that she spoke of the doctor over the phone & that she was notified of patient's passing. All departments notified. Daughter is on her way to the hospital. [facility] was called & she was deemed not a candidate for eye & organ donation.

## 2023-03-07 NOTE — PROGRESS NOTES
03/07/23 1124   Clinical Encounter Type   Visited With Health care provider   Routine Visit   (Responded to the page)   Continue Visiting Yes  (Waiting for family to arrive)   Crisis Visit Death     Spiritual Care support can be requested via an Epic consult. For urgent/immediate needs, please contact the On Call  at ext. [phone].

## 2023-03-07 NOTE — PLAN OF CARE
Patient's daughter Karolyn Menjivar called looking for Dr Marilyn Hudson told her that she will discuss w/ her goals of care for patient today. MD was informed through perfect serve. She called RN back & said patient's daughter isn't picking up the phone when she called her. Patient is comfortable in bed.

## 2025-07-10 NOTE — PROGRESS NOTES
[facility]   at the [facility] ER   Otolaryngology Clinic     Patient: Gracy Bautista    MRN: [id number]    : 1964    Age/Gender: 60 year old female  Date of Service: 7/10/2025  Rendering Provider:   Miriam Jacobs MD         Impression & Plan     IMPRESSION: Ms. Bautista is a 60 year old female who is being seen for the followin. 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, left karen's edema  - 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  - will need to rule out fungal infection (given abx and steroid use) - will treat with fluconazole and have close follow up   - will also preschedule surgery with EGD  - saw pulm on 2/10   - symptoms 2025 are the same, reports she will be finished with tobacco cessation by time of surgery  - scope shows 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, left karen's edema  - discussed the lesion is still present so will continue with planned surgery  - s/p MDL with type 2 cordectomy, RIGHT, steroid injection 3/26/25  - pathology with hyperkeratosis, negative cancer   - symptoms 4/3/2025 are improved, not smoking  - scope shows right vocal fold with significant white changes but is healing well, left vocal fold with leukoplakia over superior surface  - discussed continuing voice therapy   - symptoms 2025 are voice improving but still has significant pain with  Quick Note:    Radiology requesting additional views  Pt confirm pt has these scheduled  ______ prolonged talking, describes significant reflux   - scope shows right vocal fold much improved, left vocal fold still with superior superficial changes, mild redness and Kiki's appearance  - discussed that vocal fold is healing and she does appear to have some reflux changes and reports significant reflux  - will add to her current regiment with reflux gourmet and gum as well as Pepcid  - if no improvement at follow up will refer to GI  - symptoms 7/10/2025 are improved, voice pretty good, however still has reflux, started Dupixent and is on steroid  - scope shows right vocal fold well healed, left vocal fold stable small leukoplakia, mild Kiki's edema  - discussed continuing voice exercises  - discussed left vocal fold has stable leukoplakia, will continue to monitor this  Plan  - continue voice exercises  - reflux gourmet after meals and at bedtime  - reflux gum as needed  - continue Pepcid 20 mg twice daily      2. Dysphagia  - in the setting of prior achalasia per report  - solids are difficult  - liquids are difficult  - pills get stuck   - weight changes yes  - GI work up had esophagram on 7/2024 which was normal   - scope shows no pooling of saliva  - FEES shows no penetration, no aspiration burping  - symptoms due to esophageal etiology likely  - will obtain Xray Video Swallow Exam with follow through   - will have EGD done at the time of surgery  - EGD was normal 03/26/2025  - symptoms 7/10/2025 are continues to have difficulty with dry foods and reflux   - scope shows right vocal fold well healed, left vocal fold stable small leukoplakia, mild Kiki's edema  - discussed since reflux is not controlled and she continues to have dysphagia will refer her to GI   - patient in agreement  Plan   - referral to GI for reflux and dysphagia    RETURN VISIT: 2-3 months    Chief Complaint   Dysphonia   Dysphagia   S/p MDL with type 2 cordectomy, RIGHT, steroid injection 3/26/25  Interval History   HISTORY OF  PRESENT ILLNESS: Ms. Bautista is a 60 year old female who presents to us today with dysphonia and dysphagia.     Today, she presents for follow up. she reports:  - has been taking pepcid as previously directed with some improvement in GERD. Still having a sore throat and intermittent reflux symptoms.   - has been doing therapy and exercises with gradual improvement in voice.     Dysphonia:  reports    Dysphagia:  denies    Dyspnea:  denies    Coughing / Throat-clearing:  reports    GERD/LPRD:  reports     PAST MEDICAL HISTORY:   Past Medical History:   Diagnosis Date    Allergic rhinitis     Arthritis     Chronic sinusitis     COPD (chronic obstructive pulmonary disease) (H)     Depressive disorder     Hoarseness     Reduced vision     Uncomplicated asthma        PAST SURGICAL HISTORY:   Past Surgical History:   Procedure Laterality Date    BIOPSY      COLONOSCOPY N/A 08/22/2022    Procedure: COLONOSCOPY, WITH POLYPECTOMY AND BIOPSY;  Surgeon: Glen Dyer MD;  Location: [facility] OR    COLONOSCOPY WITH CO2 INSUFFLATION N/A 08/22/2022    Procedure: COLONOSCOPY, WITH CO2 INSUFFLATION;  Surgeon: Glen Dyer MD;  Location: [facility] OR    ESOPHAGOSCOPY, GASTROSCOPY, DUODENOSCOPY (EGD), COMBINED N/A 09/02/2022    Procedure: ESOPHAGOGASTRODUODENOSCOPY, WITH BIOPSY AND POLYPECTOMY;  Surgeon: Colton Sarkar MD;  Location: [facility] OR    ESOPHAGOSCOPY, GASTROSCOPY, DUODENOSCOPY (EGD), COMBINED N/A 3/26/2025    Procedure: Esophagoscopy, gastroscopy, duodenoscopy (EGD) with biopsies;  Surgeon: Prabhu Muniz MD;  Location: [facility] OR    GYN SURGERY      INJECT STEROID (LOCATION) N/A 3/26/2025    Procedure: steroid injection;  Surgeon: Miriam Jacobs MD;  Location: [facility] OR    IR BONE BIOPSY DEEP RIGHT  2/17/2023    LASER CO2 LARYNGOSCOPY N/A 3/26/2025    Procedure: MICROLARYNGOSCOPY CO2 laser resection of vocal fold lesion and biopsy;  Surgeon: Miriam Jacobs MD;  Location: [facility] OR    NASAL/SINUS POLYPECTOMY       OPTICAL TRACKING SYSTEM ENDOSCOPIC SINUS SURGERY Bilateral 01/21/2022    Procedure: stealth guided, bilateral ethmoidectomy, bilateral frontal sinusotomy, bilateral maxillary antrostomies;  Surgeon: Digna Jaimes MD;  Location: [facility] OR    ORTHOPEDIC SURGERY         CURRENT MEDICATIONS:   Current Outpatient Medications:     acetaminophen (TYLENOL) 500 MG tablet, Take 2 tablets (1,000 mg) by mouth every 8 hours as needed for mild pain, Disp: 10 tablet, Rfl: 0    amphetamine-dextroamphetamine (ADDERALL XR) 20 MG 24 hr capsule, Take 40 mg by mouth., Disp: , Rfl:     bisacodyl (DULCOLAX) 5 MG EC tablet, Take 5 mg by mouth as needed for constipation., Disp: , Rfl:     budesonide (PULMICORT) 0.5 MG/2ML neb solution, SPRAY 2 MLS (0.5 MG) IN NOSTRIL DAILY., Disp: 360 mL, Rfl: 1    Cholecalciferol (VITAMIN D3) 50 MCG (2000 UT) CAPS, Take 4,000 Units by mouth., Disp: , Rfl:     CYMBALTA 60 MG capsule, Take 2 capsules (120 mg) by mouth every morning, Disp: , Rfl:     famotidine (PEPCID) 20 MG tablet, Take 1 tablet (20 mg) by mouth 2 times daily., Disp: 60 tablet, Rfl: 3    ipratropium - albuterol 0.5 mg/2.5 mg/3 mL (DUONEB) 0.5-2.5 (3) MG/3ML neb solution, Take 1 vial (3 mLs) by nebulization every 6 hours as needed for shortness of breath, wheezing or cough., Disp: 90 mL, Rfl: 3    LAMICTAL 200 MG tablet, Take 200 mg by mouth every morning , Disp: , Rfl:     levalbuterol (XOPENEX HFA) 45 MCG/ACT inhaler, Inhale 2 puffs into the lungs every 4 hours as needed for shortness of breath or wheezing., Disp: 15 g, Rfl: 3    levalbuterol (XOPENEX HFA) 45 MCG/ACT inhaler, Inhale 2 puffs into the lungs every 4 hours as needed for shortness of breath or wheezing, Disp: 45 g, Rfl: 0    levalbuterol (XOPENEX) 1.25 MG/3ML neb solution, Take 3 mLs (1.25 mg) by nebulization every 4 hours as needed for shortness of breath or wheezing., Disp: 270 mL, Rfl: 3    LORazepam (ATIVAN) 0.5 MG tablet, , Disp: , Rfl:     spacer (OPTICHAMBER ELBA)  holding chamber, Use with albuterol (PROAIR HFA/PROVENTIL HFA/VENTOLIN HFA) 108 (90 Base) MCG/ACT inhaler, Disp: 1 each, Rfl: 0    budesonide-formoterol (SYMBICORT/BREYNA) 160-4.5 MCG/ACT Inhaler, Inhale 2 puffs into the lungs 2 times daily., Disp: 10.2 g, Rfl: 11    nicotine polacrilex (NICORETTE) 4 MG gum, 4 mg every hour as needed, Disp: , Rfl:     ALLERGIES: Bee venom, Cefdinir, Levofloxacin, and Prednisone    SOCIAL HISTORY:    Social History     Socioeconomic History    Marital status:      Spouse name: Not on file    Number of children: Not on file    Years of education: Not on file    Highest education level: Not on file   Occupational History    Occupation: HOMEMAKER   Tobacco Use    Smoking status: Light Smoker     Types: Cigarettes     Start date: 9/17/2021    Smokeless tobacco: Never    Tobacco comments:     I am willing.  I haven't been successful and my mindset is not completely there yet. 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    Drug use: Never    Sexual activity: Not Currently     Partners: Male     Birth control/protection: None     Comment: Hysterectomy   Other Topics Concern    Parent/sibling w/ CABG, MI or angioplasty before 65F 55M? Yes   Social History Narrative    October 7, 2021    ENVIRONMENTAL HISTORY: [place] The home is heated with a electric furnace. They do have central air conditioning. The patient's bedroom is furnished with carpeting in bedroom, allergen mattress cover, and allergen pillowcase cover.  Pets inside the house include 1 dog. There is no history of cockroach or mice infestation. There is/are 0 smokers in the house.  The house does have a damp basement.      Social Drivers of Health     Financial Resource Strain: Low Risk  (2/6/2024)    Received from [facility]  [facility]    Overall Financial Resource Strain (CARDIA)     Difficulty of Paying Living Expenses: Not hard at all   Food Insecurity: No Food Insecurity (5/15/2025)    Received from [facility] and [facility]    Hunger Vital Sign     Worried About Running Out of Food in the Last Year: Never true     Ran Out of Food in the Last Year: Never true   Transportation Needs: No Transportation Needs (5/15/2025)    Received from [facility] and [facility]    PRAPARE - Transportation     Lack of Transportation (Medical): No     Lack of Transportation (Non-Medical): No   Physical Activity: Insufficiently Active (7/26/2019)    Received from [facility]    Exercise Vital Sign     Days of Exercise per Week: 3 days     Minutes of Exercise per Session: 30 min   Stress: No Stress Concern Present (7/26/2019)    Received from [facility]    [employer] - Occupational Stress Questionnaire     Feeling of Stress : Only a little   Social Connections: Socially Integrated (7/26/2019)    Received from [facility]    Social Connection and Isolation Panel [NHANES]     Frequency of Communication with Friends and Family: More than three times a week     Frequency of Social Gatherings with Friends and Family: Once a week     Attends Quaker Services: More than 4 times per year     Active Member of Clubs or Organizations: Yes     Attends Club or Organization Meetings: More than 4 times per year     Marital Status:    Interpersonal Safety: Not At Risk (5/15/2025)    Received from [facility] and [facility]    EH IP Custom IPV     Do you feel UNSAFE in any of your personal relationships with your family members or any other acquaintances?: No   Housing Stability: Low Risk  (5/15/2025)    Received from [facility]    Housing Stability Vital Sign     Unable to Pay for Housing in the Last Year:  No     Number of Times Moved in the Last Year: 0     Homeless in the Last Year: No         FAMILY HISTORY:   Family History   Problem Relation Age of Onset    Bleeding Disorder Mother     Hypertension Mother     Hyperlipidemia Mother     Diabetes Father     Heart Disease Father     Cerebrovascular Disease Father     Hypertension Father     Hyperlipidemia Father     Cancer Brother     Bleeding Disorder Brother     Anesthesia Reaction No family hx of     Deep Vein Thrombosis (DVT) No family hx of       Non-contributory for problems with anesthesia    REVIEW OF SYSTEMS:   The patient was asked a 14 point review of systems regarding constitutional symptoms, eye symptoms, ears, nose, mouth, throat symptoms, cardiovascular symptoms, respiratory symptoms, gastrointestinal symptoms, genitourinary symptoms, musculoskeletal symptoms, integumentary symptoms, neurological symptoms, psychiatric symptoms, endocrine symptoms, hematologic/lymphatic symptoms, and allergic/ immunologic symptoms.   The pertinent factors have been included in the HPI and below.  Patient Supplied Answers to Review of Systems      7/5/2025     9:21 AM   [facility] E[facility] ROS   Constitutional Unexplained fatigue   Neurology Headache   Ears, Nose, Throat Ear pain    Nasal congestion or drainage    Sore throat    Hoarseness   Cardiopulmonary Cough    Wheezing   Gastrointestinal/Genitourinary Heartburn/indigestion    Constipation   Musculoskeletal Swollen joints    Swollen legs/feet   Hematologic Bleeding problems    Easy bruising   Endocrine Heat or cold intolerance   Skin Skin lesions       Physical Examination   The patient underwent a physical examination as described below. The pertinent positive and negative findings are summarized after the description of the examination.  Constitutional: The patient's developmental and nutritional status was assessed. The patient's voice quality was assessed.  Head and Face: The head and face were inspected for deformities. The  sinuses were palpated. The salivary glands were palpated. Facial muscle strength was assessed bilaterally.  Eyes: Extraocular movements and primary gaze alignment were assessed.  Ears, Nose, Mouth and Throat: The ears and nose were examined for deformities. The nasal septum, mucosa, and turbinates were inspected by anterior rhinoscopy. The lips, teeth, and gums were examined for abnormalities. The oral mucosa, tongue, palate, tonsils, lateral and posterior pharynx were inspected for the presence of asymmetry or mucosal lesions.    Neck: The tracheal position was noted, and the neck mass palpated to determine if there were any asymmetries, abnormal neck masses, thyromegally, or thyroid nodules.  Respiratory: The nature of the breathing and chest expansion/symmetry was observed.  Cardiovascular: The patient was examined to determine the presence of any edema or jugular venous distension.  Abdomen: The contour of the abdomen was noted.  Lymphatic: The patient was examined for infraclavicular lymphadenopathy.  Musculoskeletal: The patient was inspected for the presence of skeletal deformities.  Extremities: The extremities were examined for any clubbing or cyanosis.  Skin: The skin was examined for inflammatory or neoplastic conditions.  Neurologic: The patient's orientation, mood, and affect were noted. The cranial nerve  functions were examined.  Other pertinent positive and negative findings on physical examination:   OC/OP: no lesions, uvula midline, soft palate elevates symmetrically   Neck: no lesions, no TH tenderness to palpation     All other physical examination findings were within normal limits and noncontributory.    Procedures   Video Laryngoscopy with Stroboscopy (CPT 16583)    Preoperative Diagnosis:  Dysphonia and throat symptoms  Postoperative Diagnosis: Dysphonia and throat symptoms  Indication:  Patient has new or persistent dysphonia and throat symptoms.  This requires evaluation by stroboscopy to  fully delineate the laryngeal functioning; especially mucosal wave assessment, ultrasharp visualization of lesions on the vocal folds, and overall functioning of the larynx.  Details of Procedure: A 70 degree rigid telescopic laryngoscope or a distal chip flexible scope was used. The lighting was obtained via a light cable connected to a stroboscopic unit. The telescope was inserted either transorally or transnasally until the vocal folds could be visualized. The patient was instructed to sustain the vowel  ee  at a comfortable pitch and loudness as the voice was monitored by a microphone connected to a fundamental frequency tracker. This circuit tracked vocal periodicity, allowing the light to flash in synchrony with the glottal cycles. A setting on the stroboscope was set to change the phase of light strobing with relation to the vocal fundamental frequency, producing an image of 1 to 2 glottal cycles every second. The video images were recorded for analysis. Use of the variable speed, slow and stop scan allowed careful study of pertinent segments of laryngeal function to increase accuracy of clinical assessments of function and dysfunction.  In particular, features of glottal closure, mucosal wave on the vocal fold cover and laryngeal symmetry were analyzed. Lastly, the patient was asked to phonate speech samples and auditory/perceptual evaluation of voice and resonance were performed.  The vocal quality was carefully evaluated for hoarseness, breathiness, loudness, phrase length and intelligibility to determine the source of dysphonia.     Findings:       B. LARYNGOVIDEOSTROBOSCOPY   Anatomic/Physiological Deviations:  RNC, right vocal fold well healed, left vocal fold stable small leukoplakia, mild Kiki's edema  Mucosal wave: Right:     No restriction                             Left:     No restriction  Bilateral Vocal Fold Vibration: Symmetric  Vocal Process: Right:     No restriction    Left:    No  "restriction  Vocal Fold closure: Complete glottal closure     Complication(s): None  Disposition: Patient tolerated the procedure well              Review of Relevant Clinical Data   I personally reviewed:  Notes:     06/10/2025 Melinda Marquis, AGATA  SUBJECTIVE:  Voice doing better  Has some soreness but hard to tell if it's because of coughing  Coughing in her sleep and still productive  Has supplemental oxygen and has been in the mid 80s  Still has some hoarseness  Has some globus sensation after eating toast right now and just started famotidine  ___________________  Labs:  Lab Results   Component Value Date    TSH 0.75 05/08/2025     Lab Results   Component Value Date     05/08/2025    CO2 25 05/08/2025    BUN 14.7 05/08/2025     Lab Results   Component Value Date    WBC 8.3 10/30/2024    HGB 15.8 (H) 10/30/2024    HCT 46.7 10/30/2024    MCV 98 10/30/2024     10/30/2024     Lab Results   Component Value Date    PTT 30 10/21/2020    INR 1.01 02/17/2023     No results found for: \"BRICE\"  No components found for: \"RHEUMATOIDFACTOR\", \"RF\"  Lab Results   Component Value Date    CRP <2.9 09/21/2020     No components found for: \"CKTOT\", \"URICACID\"  No components found for: \"C3\", \"C4\", \"DSDNAAB\", \"NDNAABIFA\"  No results found for: \"MPOAB\"    Patient reported Quality of Life (QOL) Measures   Patient Supplied Answers To VHI Questionnaire      5/26/2025    12:48 PM   Voice Handicap Index (VHI-10)   My voice makes it difficult for people to hear me 0   People have difficulty understanding me in a noisy room 1   My voice difficulties restrict my personal and social life.  0   I feel left out of conversations because of my voice 0   My voice problem causes me to lose income 0   I feel as though I have to strain to produce voice 2   The clarity of my voice is unpredictable 2   My voice problem upsets me 1   My voice makes me feel handicapped 0   People ask, \"What's wrong with your voice?\" 0   VHI-10 6        " Patient-reported         Patient Supplied Answers To EAT Questionnaire      3/13/2025    12:41 PM   Eating Assessment Tool (EAT-10)   My swallowing problem has caused me to lose weight 2   My swallowing problem interferes with my ability to go out for meals 0   Swallowing liquids takes extra effort 1   Swallowing solids takes extra effort 1   Swallowing pills takes extra effort 1   Swallowing is painful 1   The pleasure of eating is affected by my swallowing 1   When I swallow food sticks in my throat 2   I cough when I eat 2   Swallowing is stressful 1   EAT-10 12        Patient-reported         Patient Supplied Answers To CSI Questionnaire      6/9/2025    12:23 PM   Cough Severity Index (CSI)   My cough is worse when I lie down 3   My coughing problem causes me to restrict my personal and social life 0   I tend to avoid places because of my cough problem 0   I feel embarrassed because of my coughing problem 1   People ask, ''What's wrong?'' because I cough a lot 0   I run out of air when I cough 1   My coughing problem affects my voice 2   My coughing problem limits my physical activity 2   My coughing problem upsets me 1   People ask me if I am sick because I cough a lot 2   CSI Score 12        Patient-reported         Patient Supplied Answers to Dyspnea Index Questionnaire:      6/9/2025    12:23 PM   Dyspnea Index   1. I have trouble getting air in. 2   2. I feel tightness in my throat when I am having erma breathing problem. 3   3. It takes more effort to breathe than it used to. 2   4. Change in weather affect my breathing problem. 2   5. My breathing gets worse with stress. 1   6. I make sound/noise breathing in 2   7. I have to strain to breathe. 2   8. My shortness of breath gets worse with exercise or physical activity 2   9. My breathing problem makes me feel stressed. 2   10. My breathing problem casuses me to restrict my personal and social life. 2   Dyspnea Index Total Score 20        Patient-reported          Scribe Disclosure:   I, Lidya Guerrero, am serving as a scribe; to document services personally performed by Miriam Jacobs MD -based on data collection and the provider's statements to me.     Provider Disclosure:  I agree with above History, Review of Systems, Physical exam and Plan.  I have reviewed the content of the documentation and have edited it as needed. I have personally performed the services documented here and the documentation accurately represents those services and the decisions I have made.        Electronically signed by:  Miriam Jacobs MD    Laryngology    [facility]  Department of  Otolaryngology - Head and Neck Surgery  Clinics & Surgery Center  [address] [place] [address]  Appointment line: [phone]  Fax: [phone]  [email or web address]

## (undated) DEVICE — 3M™ RED DOT™ MONITORING ELECTRODE WITH FOAM TAPE AND STICKY GEL, 50/BAG, 20/CASE, 72/PLT 2570: Brand: RED DOT™

## (undated) DEVICE — ENDOSCOPY PACK - LOWER: Brand: MEDLINE INDUSTRIES, INC.

## (undated) DEVICE — TRUETOME 39 3.9 FX 20CM

## (undated) DEVICE — Device

## (undated) DEVICE — JAGWIRE STRGHT TIP .025X450CM

## (undated) DEVICE — FILTERLINE NASAL ADULT O2/CO2

## (undated) DEVICE — 1200CC GUARDIAN II: Brand: GUARDIAN

## (undated) NOTE — MR AVS SNAPSHOT
[facility]  [address] [address] [phone]               Thank you for choosing us for your health care visit with Janine Mcgill MD.  We are glad to serve you and happy to provide you with this summary of y * rivastigmine 9.5 MG/24HR Pt24   Place 1 patch onto the skin daily. Commonly known as:  EXELON           glipiZIDE 5 MG Tabs   Take 1 tablet (5 mg total) by mouth 2 (two) times daily before meals.    Commonly known as:  GLUCOTROL           Ipratropium B return for a follow-up Blood Pressure Check in 1 year.      Lifestyle Modification Recommendations:    Modification Recommendation   Weight Reduction Maintain normal body weight (body mass index 18.5-24.9 kg/m2)   DASH eating plan Adopt a diet rich in fruit

## (undated) NOTE — MR AVS SNAPSHOT
[place] [address]  [phone]               Thank you for choosing us for your health care visit with [address] [facility],[address], PT.   We are glad to serve you and happy to provide you with this summary o recent med list.                * EXELON 4.6 MG/24HR Pt24   Generic drug:  rivastigmine   APPLY ONE PATCH TOPICALLY ONCE DAILY           * rivastigmine 9.5 MG/24HR Pt24   Place 1 patch onto the skin daily.    Commonly known as:  EXELON           glipiZIDE 5

## (undated) NOTE — ED AVS SNAPSHOT
Chi Mahan   MRN: [id number]    Department:  [facility] Emergency Department   Date of Visit:  11/8/2019           Disclosure     Insurance plans vary and the physician(s) referred by the ER may not be covered by your plan.  Please contact your in tell this physician (or your personal doctor if your instructions are to return to your personal doctor) about any new or lasting problems. The primary care or specialist physician will see patients referred from the [facility] Emergency Department.  Buddy Wesley

## (undated) NOTE — Clinical Note
Pt will be coming in for HFU on 10/8/2020.   Dtr states pt reported L flank pain prior to admission but it happened weeks prior so they are not really sure if she is still having any pain (hasn't complained of any) or when the L flank pain, chest pain, and

## (undated) NOTE — MR AVS SNAPSHOT
[facility]  [address] [place] [address]  [phone]               Thank you for choosing us for your health care visit with Alise Wheeler PT.   We are glad to serve you and happy to provide you with this summar 2 sprays by Nasal route every 12 (twelve) hours. Commonly known as:  ATROVENT           Memantine HCl 10 MG Tabs   Take 1 tablet (10 mg total) by mouth 2 (two) times daily.    Commonly known as:  NAMENDA           MetFORMIN HCl 1000 MG Tabs   Take 1 table

## (undated) NOTE — Clinical Note
Can we fax pt assisted living lab orders and they will do this week. Thanks!   [facility] Fax for labs: [phone]

## (undated) NOTE — MR AVS SNAPSHOT
[facility] [address]  [facility]  [phone]               Thank you for choosing us for your health care visit with Enid Daniel PA-C.   We are glad to serve you and happy to provide you •Walk in Clinic in [place] at [facility].  [address] Mon-Fri at 8am-7:30pm, and Sat/Sun 9am-430pm  Also at [address]  Call [phone] for info    • Please call our office about any questions regarding your treatment/medicines/tests as a re Return if symptoms worsen or fail to improve.       Your Appointments     2017  9:20 AM   DIAGNOSTIC PROCEDURE with Collin Apgar   [facility] PULMONARY MEDICINE [facility]-BLD 2)    [address] [place] [address] MetFORMIN HCl 1000 MG Tabs   Take 1 tablet (1,000 mg total) by mouth 2 (two) times daily with meals. Commonly known as:  GLUCOPHAGE           Oxybutynin Chloride ER 10 MG Tb24   Take 20 mg by mouth daily.    Commonly known as:  DITROPAN-XL           r If you are confident that your benefit plan will not require a referral or authorization such as [place] Medicaid or HMO plans, you may schedule your appointment immediately.  However, if you are unsure about the requirements for your authorization, please dairy products with reduced content of saturated and total fat.    Dietary sodium reduction Reduce dietary sodium intake to <= 100 mmol per day (2.4 g sodium or 6 g sodium chloride)   Aerobic physical activity Regular aerobic physical activity (e.g., brisk

## (undated) NOTE — ED AVS SNAPSHOT
Oumar Astudillo   MRN: [id number]    Department:  [facility] Emergency Department   Date of Visit:  7/31/2017           Disclosure     Insurance plans vary and the physician(s) referred by the ER may not be covered by your plan.  Please contact your in If you have been prescribed any medication(s), please fill your prescription right away and begin taking the medication(s) as directed    If the emergency physician has read X-rays, these will be re-interpreted by a radiologist.  If there is a significant

## (undated) NOTE — LETTER
04/04/18        [place] Tran  [address]      Dear Manjula Tamez records indicate that you have outstanding lab work and or testing that was ordered for you and has not yet been completed:          Comp Metabolic Panel      Vi

## (undated) NOTE — MR AVS SNAPSHOT
[facility]  [address] [address] [phone]               Thank you for choosing us for your health care visit with Pato Polanco MD.  We are glad to serve you and happy to provide you with this summary of y This list is accurate as of: 5/16/17 11:59 PM.  Always use your most recent med list.                CEREFOLIN NAC 6-90.314-2-600 MG Tabs   Take 1 tablet by mouth daily.            * EXELON 4.6 MG/24HR Pt24   Generic drug:  rivastigmine   APPLY ONE PATCH TO Call [phone] for help. SendRRt is NOT to be used for urgent needs. For medical emergencies, dial 911.            Visit [facility] online at  [email or web address]

## (undated) NOTE — MR AVS SNAPSHOT
[facility] [address]  [facility]  [phone]               Thank you for choosing us for your health care visit with Anselmo Portillo PA-C.   We are glad to serve you and happy to provide you MRI BRAIN (CPT=70551)    Complete by: Mar 03, 2017 (Approximate)    Assoc Dx: Vertigo [R42]                 Follow-up Instructions     Return in about 4 weeks (around 3/31/2017).       Scheduling Instructions     Friday March 03, 2017     Imaging:  MRI B [facility] [phone]    [place], [address]         [facility]:  [address] [facility] in [facility] in [address], [address] [place] [address]: [phone] •Walk in Clinic in [place] at [facility].  [address] Mon-Fri at 8am-7:30pm, and Sat/Sun 9am-430pm  Also at [address]  Call [phone] for info    • Please call our office about any questions regarding your treatment/medicines/tests as a re Pcn [Penicillins] Rash    CAPS                Today's Vital Signs     BP Pulse Temp Height Weight BMI    124/60 mmHg 72 98.1 °F (36.7 °C) (Temporal) 61\" 154 lb 29.11 kg/m2         Current Medications          This list is accurate as of: 3/3/17  3:34 PM. - [facility] 6-90.314-2-600 MG Tabs      These medications were sent to [address], [address] [phone], [id number] [place] [address]     Phone:  [phone]    - glipiZIDE 5 MG walking, light jogging, cycling, swimming, etc.) for a goal of at least 150 minutes per week. Moderation of alcohol consumption Men: limit to <= 2 drinks* per day. Women and lighter weight persons: limit to <= 1 drink* per day.               Visit EDWARD

## (undated) NOTE — MR AVS SNAPSHOT
[facility] [address]  [address] [facility] [address] [place]  [phone]               Thank you for choosing us for your health care visit with Johan Gama MD.  We are glad to serve you and happy to provide you with t complication, without long-term current use of insulin ([facility] [address] [E11.9]           Vitamin B12    Complete by:   Apr 05, 2017 (Approximate)    Assoc Dx:  PORTILLO (nonalcoholic steatohepatitis) [K75.81], Hyperlipidemia, unspecified hyperlipidemia type [E78.5], Type authorization numbers or be assured that none are required. You can then schedule your appointment. Failure to obtain required authorization numbers can create reimbursement difficulties for you.     Assoc Dx:  PORTILLO (nonalcoholic steatohepatitis) [K75.81] * Notice: This list has 2 medication(s) that are the same as other medications prescribed for you. Read the directions carefully, and ask your doctor or other care provider to review them with you.             MyChart     Visit [employer]  You can access you Fully enjoy your food when eating. Don’t eat while distracted and slow down. Avoid over sized portions. Don’t eat while when you’re bored.      EAT THESE FOODS MORE OFTEN: EAT THESE FOODS LESS OFTEN:   Make half your plate fruits and vegetables Highly

## (undated) NOTE — Clinical Note
BROOK, TCM call made, see notes. [facility] placed call to Dr. Hall All office to get permission to change visit type to TCM HFU. Visit type changed.

## (undated) NOTE — MR AVS SNAPSHOT
[facility]  [address] [place] [address]  [phone]               Thank you for choosing us for your health care visit with [address] [facility],[address], PT.   We are glad to serve you and happy to provide you with this summary o This list is accurate as of: 3/14/17  1:30 PM.  Always use your most recent med list.                CEREFOLIN NAC 6-90.314-2-600 MG Tabs   Take 1 tablet by mouth daily.            * EXELON 4.6 MG/24HR Pt24   Generic drug:  rivastigmine   APPLY ONE PATCH TO Visits < Visit Summaries. MyChart questions? Call [phone] for help. [email or web address] is NOT to be used for urgent needs. For medical emergencies, dial 911.            Visit [facility] [facility] [employer] online at  [facility].tn

## (undated) NOTE — MR AVS SNAPSHOT
[facility]  [address] [place] [address] [phone]               Thank you for choosing us for your health care visit with [address] [facility],[address], PT.   We are glad to serve you and happy to provide you with this summary o This list is accurate as of: 3/2/17  1:56 PM.  Always use your most recent med list.                CEREFOLIN NAC 6-90.314-2-600 MG Tabs   Take 1 tablet by mouth daily.            * EXELON 4.6 MG/24HR Pt24   Generic drug:  rivastigmine   APPLY ONE PATCH TOP [email or web address]

## (undated) NOTE — Clinical Note
05/05/2017        Aditya Carreno  [address] [facility] [address]      Dear [facility],    [address] [place] [address] records indicate that you have outstanding lab work and or testing that was ordered for you and has not yet been completed:      COMP METABOLIC PANEL  HG

## (undated) NOTE — LETTER
[address] [place] [address] MANAGEMENT DEPARTMENT  [address] Kierra [place], [address] [facility]  [phone]                  November 23, 2020    [address] [place] [address] [id number] [facility] [address]      Dear Elizabeth Lau:   It was a pleasure speaking with you o

## (undated) NOTE — LETTER
Date: 3/5/2018    Patient Name: Ranjan Puga          To Whom it may concern: This letter has been written at the patient's request. The above patient was seen at the [facility] for treatment of a medical condition.     She may exercise in f

## (undated) NOTE — Clinical Note
2017    Patient: Елена Rocha  : 1940 Visit date: 2017    Dear Chucky Habermann, PA\Dr. Calvin Alexander MD,    Thank you for referring Елена Rocha to my practice. Please find my assessment and plan below.        Assessment:       The patient is a 7 Thank you for allowing me to participate in your patient's care.        Sincerely,       Lorena Fried MD   CC: Mike Crowder PA-C

## (undated) NOTE — Clinical Note
GENERAL SURGERY    Aditya Whatley MD, FACS, Yvette Castillo. Gladis Pickens MD, Mary Jo Sadler MD, FACS  Brain Arnt.  Ekta Saucedo MD, Catrachita Nowak MD, FACS  [facility] PRETTY SALDAÑA PA-C  6/4/1940 5/17/

## (undated) NOTE — MR AVS SNAPSHOT
[place] [address]  [phone]               Thank you for choosing us for your health care visit with [address] [facility],[address], PT.   We are glad to serve you and happy to provide you with this summary o Place 1 patch onto the skin daily. Commonly known as:  EXELON           glipiZIDE 5 MG Tabs   Take 1 tablet (5 mg total) by mouth 2 (two) times daily before meals.    Commonly known as:  GLUCOTROL           Ipratropium Bromide 0.03 % Soln   2 sprays by Na

## (undated) NOTE — MR AVS SNAPSHOT
[facility]  [address] [place] [address]  [phone]               Thank you for choosing us for your health care visit with [address] [facility], PT.   We are glad to serve you and happy to provide you with this summary o recent med list.                * EXELON 4.6 MG/24HR Pt24   Generic drug:  rivastigmine   APPLY ONE PATCH TOPICALLY ONCE DAILY           * rivastigmine 9.5 MG/24HR Pt24   Place 1 patch onto the skin daily.    Commonly known as:  EXELON           glipiZIDE 5